# Patient Record
Sex: FEMALE | Race: OTHER | Employment: FULL TIME | URBAN - METROPOLITAN AREA
[De-identification: names, ages, dates, MRNs, and addresses within clinical notes are randomized per-mention and may not be internally consistent; named-entity substitution may affect disease eponyms.]

---

## 2017-01-04 ENCOUNTER — APPOINTMENT (OUTPATIENT)
Dept: OCCUPATIONAL MEDICINE | Age: 42
End: 2017-01-04
Attending: EMERGENCY MEDICINE

## 2017-01-13 ENCOUNTER — APPOINTMENT (OUTPATIENT)
Dept: OCCUPATIONAL MEDICINE | Age: 42
End: 2017-01-13
Attending: EMERGENCY MEDICINE

## 2017-04-08 ENCOUNTER — TELEPHONE (OUTPATIENT)
Dept: FAMILY MEDICINE CLINIC | Facility: CLINIC | Age: 42
End: 2017-04-08

## 2017-04-08 NOTE — TELEPHONE ENCOUNTER
On-call note that she is having a splitting headache that is going from her head down her shoulders. She states this started yesterday. It is on the left side of her face. Goes from her face down her left shoulder. She had migraines possibly years ago.

## 2017-05-30 ENCOUNTER — OFFICE VISIT (OUTPATIENT)
Dept: FAMILY MEDICINE CLINIC | Facility: CLINIC | Age: 42
End: 2017-05-30

## 2017-05-30 ENCOUNTER — LAB ENCOUNTER (OUTPATIENT)
Dept: LAB | Age: 42
End: 2017-05-30
Attending: FAMILY MEDICINE
Payer: COMMERCIAL

## 2017-05-30 VITALS
DIASTOLIC BLOOD PRESSURE: 70 MMHG | SYSTOLIC BLOOD PRESSURE: 106 MMHG | WEIGHT: 108 LBS | BODY MASS INDEX: 19.88 KG/M2 | TEMPERATURE: 98 F | HEIGHT: 62 IN | HEART RATE: 75 BPM

## 2017-05-30 DIAGNOSIS — R51.9 RIGHT-SIDED HEADACHE: ICD-10-CM

## 2017-05-30 DIAGNOSIS — R68.89 LIGHT SENSITIVITY: ICD-10-CM

## 2017-05-30 DIAGNOSIS — Z00.00 WELL ADULT EXAM: ICD-10-CM

## 2017-05-30 DIAGNOSIS — Z00.00 WELL ADULT EXAM: Primary | ICD-10-CM

## 2017-05-30 DIAGNOSIS — O09.A0 HISTORY OF MOLAR PREGNANCY, ANTEPARTUM: ICD-10-CM

## 2017-05-30 DIAGNOSIS — E28.39 PREMATURE OVARIAN FAILURE: ICD-10-CM

## 2017-05-30 PROCEDURE — 36415 COLL VENOUS BLD VENIPUNCTURE: CPT

## 2017-05-30 PROCEDURE — 99396 PREV VISIT EST AGE 40-64: CPT | Performed by: FAMILY MEDICINE

## 2017-05-30 PROCEDURE — 85025 COMPLETE CBC W/AUTO DIFF WBC: CPT

## 2017-05-30 PROCEDURE — 84450 TRANSFERASE (AST) (SGOT): CPT

## 2017-05-30 PROCEDURE — 80048 BASIC METABOLIC PNL TOTAL CA: CPT

## 2017-05-30 PROCEDURE — 80061 LIPID PANEL: CPT

## 2017-05-30 PROCEDURE — 84460 ALANINE AMINO (ALT) (SGPT): CPT

## 2017-05-30 PROCEDURE — 82306 VITAMIN D 25 HYDROXY: CPT

## 2017-05-30 PROCEDURE — 84443 ASSAY THYROID STIM HORMONE: CPT

## 2017-05-30 NOTE — PROGRESS NOTES
HPI:   Vanessa Tucker is a 35year old female who presents for a complete physical exam. Symptoms: no menses.     Hx of right sided headaches, usu 1 x month  Light sensitivity  Happens 1 x month  Sleeping helps    Wt Readings from Last 6 Encounters:  05/30 Disorder Father    • Hypertension Father    • Diabetes Neg       Social History:     Smoking Status: Never Smoker                      Alcohol Use: No              Occ: factory. : n. Children: n.       Immunization History   Administered Date(s) Admi check fasting Lipids, CMP, TSH and CBC. Pt info handouts given for: exercise, low fat diet and breast self-exam. Pt' s weight is Body mass index is 19.75 kg/(m^2). , recommended low fat diet and aerobic exercise 30 minutes three 5 weekly.  Immunizations and

## 2017-06-07 PROBLEM — E78.00 HYPERCHOLESTEREMIA: Status: ACTIVE | Noted: 2017-06-07

## 2017-06-08 ENCOUNTER — TELEPHONE (OUTPATIENT)
Dept: FAMILY MEDICINE CLINIC | Facility: CLINIC | Age: 42
End: 2017-06-08

## 2017-06-08 NOTE — TELEPHONE ENCOUNTER
----- Message from Darlene Noriega MD sent at 6/7/2017  7:23 PM CDT -----  Labs all good except Lipid profile shows slightly elevated cholesterol. Recommend  low fat/ low cholesterol diet and aerobic exercise 30 minutes at least 5 days a week.  Recommend repea

## 2017-06-22 NOTE — TELEPHONE ENCOUNTER
[facility] [phone]. No response letter sent with information below.

## 2018-04-03 ENCOUNTER — HOSPITAL ENCOUNTER (OUTPATIENT)
Age: 43
Discharge: HOME | End: 2018-04-03
Attending: EMERGENCY MEDICINE
Payer: COMMERCIAL

## 2018-04-03 VITALS
DIASTOLIC BLOOD PRESSURE: 67 MMHG | WEIGHT: 113 LBS | HEART RATE: 103 BPM | SYSTOLIC BLOOD PRESSURE: 106 MMHG | OXYGEN SATURATION: 99 % | BODY MASS INDEX: 20.8 KG/M2 | HEIGHT: 62 IN | RESPIRATION RATE: 20 BRPM | TEMPERATURE: 100 F

## 2018-04-03 DIAGNOSIS — J11.1 INFLUENZA-LIKE ILLNESS: Primary | ICD-10-CM

## 2018-04-03 DIAGNOSIS — H66.92 ACUTE LEFT OTITIS MEDIA: ICD-10-CM

## 2018-04-03 PROCEDURE — 99213 OFFICE O/P EST LOW 20 MIN: CPT

## 2018-04-03 PROCEDURE — 99204 OFFICE O/P NEW MOD 45 MIN: CPT

## 2018-04-03 PROCEDURE — 87631 RESP VIRUS 3-5 TARGETS: CPT | Performed by: EMERGENCY MEDICINE

## 2018-04-03 RX ORDER — AZITHROMYCIN 250 MG/1
TABLET, FILM COATED ORAL
Qty: 6 TABLET | Refills: 0 | Status: SHIPPED | OUTPATIENT
Start: 2018-04-03 | End: 2018-07-27

## 2018-04-03 NOTE — ED INITIAL ASSESSMENT (HPI)
Cough, body aches, fevers and chills x 3 days. No flu shot. Denies sore throat. Vomited once today and once yesterday. Has been drinking water, 7UP and has been urinating.

## 2018-04-03 NOTE — ED PROVIDER NOTES
Patient Seen in: [facility] In [address] [place] [address]    History   Patient presents with:  Cough/URI    Stated Complaint: body ache, cough    HPI    The patient is a 40-year-old female without contributory past medical history presents with compl None (Room air)    Current:/67   Pulse 103   Temp 100.1 °F (37.8 °C) (Oral)   Resp 20   Ht 157.5 cm (5' 2\")   Wt 51.3 kg   LMP  (LMP Unknown)   SpO2 99%   BMI 20.67 kg/m²         Physical Exam    Alert female who appears fatigued  HEENT: Normocephal

## 2018-07-03 ENCOUNTER — OFFICE VISIT (OUTPATIENT)
Dept: FAMILY MEDICINE CLINIC | Facility: CLINIC | Age: 43
End: 2018-07-03

## 2018-07-03 VITALS
DIASTOLIC BLOOD PRESSURE: 53 MMHG | HEART RATE: 52 BPM | SYSTOLIC BLOOD PRESSURE: 89 MMHG | HEIGHT: 62 IN | BODY MASS INDEX: 20.06 KG/M2 | TEMPERATURE: 98 F | WEIGHT: 109 LBS

## 2018-07-03 DIAGNOSIS — R53.83 FATIGUE, UNSPECIFIED TYPE: ICD-10-CM

## 2018-07-03 DIAGNOSIS — E28.39 PREMATURE OVARIAN FAILURE: ICD-10-CM

## 2018-07-03 DIAGNOSIS — Z00.00 WELL ADULT EXAM: Primary | ICD-10-CM

## 2018-07-03 DIAGNOSIS — E78.00 HYPERCHOLESTEREMIA: ICD-10-CM

## 2018-07-03 DIAGNOSIS — N91.2 AMENORRHEA: ICD-10-CM

## 2018-07-03 PROCEDURE — 99396 PREV VISIT EST AGE 40-64: CPT | Performed by: FAMILY MEDICINE

## 2018-07-03 NOTE — PROGRESS NOTES
HPI:   Garcia Alfaro is a 38year old female who presents for a complete physical exam. Symptoms: no menses since chemo.     Wt Readings from Last 6 Encounters:  07/03/18 : 109 lb (49.4 kg)  04/03/18 : 113 lb (51.3 kg)  05/30/17 : 108 lb (49 kg)  12/20/16 • High Blood Pressure Father    • Stroke Father 58   • Prostate Cancer Father    • Cancer Father      prostate cancer   • Lipids Father    • Heart Disorder Father    • Hypertension Father    • Diabetes Neg       Social History:   Smoking status: Never Sm Oriented times three,cranial nerves are intact,motor and sensory are grossly intact    ASSESSMENT AND PLAN:   Jb Bryant is a 38year old female who presents for a complete physical exam.Health maintenance, will check fasting Lipids, CMP, TSH and CBC.

## 2018-07-07 ENCOUNTER — LAB ENCOUNTER (OUTPATIENT)
Dept: LAB | Age: 43
End: 2018-07-07
Attending: FAMILY MEDICINE
Payer: COMMERCIAL

## 2018-07-07 DIAGNOSIS — Z00.00 WELL ADULT EXAM: ICD-10-CM

## 2018-07-07 LAB
ALT SERPL-CCNC: 17 U/L (ref 14–54)
ANION GAP SERPL CALC-SCNC: 7 MMOL/L (ref 0–18)
AST SERPL-CCNC: 24 U/L (ref 15–41)
BASOPHILS # BLD: 0 K/UL (ref 0–0.2)
BASOPHILS NFR BLD: 1 %
BUN SERPL-MCNC: 16 MG/DL (ref 8–20)
BUN/CREAT SERPL: 19 (ref 10–20)
CALCIUM SERPL-MCNC: 9.4 MG/DL (ref 8.5–10.5)
CHLORIDE SERPL-SCNC: 105 MMOL/L (ref 95–110)
CHOLEST SERPL-MCNC: 206 MG/DL (ref 110–200)
CO2 SERPL-SCNC: 27 MMOL/L (ref 22–32)
CREAT SERPL-MCNC: 0.84 MG/DL (ref 0.5–1.5)
EOSINOPHIL # BLD: 0.1 K/UL (ref 0–0.7)
EOSINOPHIL NFR BLD: 2 %
ERYTHROCYTE [DISTWIDTH] IN BLOOD BY AUTOMATED COUNT: 13.8 % (ref 11–15)
GLUCOSE SERPL-MCNC: 84 MG/DL (ref 70–99)
HCT VFR BLD AUTO: 42.7 % (ref 35–48)
HDLC SERPL-MCNC: 64 MG/DL
HGB BLD-MCNC: 14.4 G/DL (ref 12–16)
LDLC SERPL CALC-MCNC: 124 MG/DL (ref 0–99)
LYMPHOCYTES # BLD: 1.3 K/UL (ref 1–4)
LYMPHOCYTES NFR BLD: 19 %
MCH RBC QN AUTO: 29.7 PG (ref 27–32)
MCHC RBC AUTO-ENTMCNC: 33.7 G/DL (ref 32–37)
MCV RBC AUTO: 88.1 FL (ref 80–100)
MONOCYTES # BLD: 0.5 K/UL (ref 0–1)
MONOCYTES NFR BLD: 7 %
NEUTROPHILS # BLD AUTO: 4.7 K/UL (ref 1.8–7.7)
NEUTROPHILS NFR BLD: 72 %
NONHDLC SERPL-MCNC: 142 MG/DL
OSMOLALITY UR CALC.SUM OF ELEC: 288 MOSM/KG (ref 275–295)
PLATELET # BLD AUTO: 201 K/UL (ref 140–400)
PMV BLD AUTO: 9.6 FL (ref 7.4–10.3)
POTASSIUM SERPL-SCNC: 3.6 MMOL/L (ref 3.3–5.1)
RBC # BLD AUTO: 4.84 M/UL (ref 3.7–5.4)
SODIUM SERPL-SCNC: 139 MMOL/L (ref 136–144)
TRIGL SERPL-MCNC: 89 MG/DL (ref 1–149)
TSH SERPL-ACNC: 3.72 UIU/ML (ref 0.45–5.33)
WBC # BLD AUTO: 6.5 K/UL (ref 4–11)

## 2018-07-07 PROCEDURE — 80048 BASIC METABOLIC PNL TOTAL CA: CPT

## 2018-07-07 PROCEDURE — 85025 COMPLETE CBC W/AUTO DIFF WBC: CPT

## 2018-07-07 PROCEDURE — 84450 TRANSFERASE (AST) (SGOT): CPT

## 2018-07-07 PROCEDURE — 80061 LIPID PANEL: CPT

## 2018-07-07 PROCEDURE — 36415 COLL VENOUS BLD VENIPUNCTURE: CPT

## 2018-07-07 PROCEDURE — 84443 ASSAY THYROID STIM HORMONE: CPT

## 2018-07-07 PROCEDURE — 84460 ALANINE AMINO (ALT) (SGPT): CPT

## 2018-07-27 ENCOUNTER — OFFICE VISIT (OUTPATIENT)
Dept: OBGYN CLINIC | Facility: CLINIC | Age: 43
End: 2018-07-27

## 2018-07-27 VITALS
WEIGHT: 109 LBS | HEART RATE: 65 BPM | SYSTOLIC BLOOD PRESSURE: 92 MMHG | BODY MASS INDEX: 20.06 KG/M2 | DIASTOLIC BLOOD PRESSURE: 64 MMHG | HEIGHT: 61.7 IN

## 2018-07-27 DIAGNOSIS — N91.2 ABSENT PERIODS: ICD-10-CM

## 2018-07-27 DIAGNOSIS — Z01.411 ENCOUNTER FOR GYNECOLOGICAL EXAMINATION WITH ABNORMAL FINDING: Primary | ICD-10-CM

## 2018-07-27 DIAGNOSIS — Z12.31 VISIT FOR SCREENING MAMMOGRAM: ICD-10-CM

## 2018-07-27 PROCEDURE — 99396 PREV VISIT EST AGE 40-64: CPT | Performed by: OBSTETRICS & GYNECOLOGY

## 2018-07-27 PROCEDURE — 99213 OFFICE O/P EST LOW 20 MIN: CPT | Performed by: OBSTETRICS & GYNECOLOGY

## 2018-07-29 LAB
C TRACH DNA SPEC QL NAA+PROBE: NEGATIVE
HPV I/H RISK 1 DNA SPEC QL NAA+PROBE: NEGATIVE
N GONORRHOEA DNA SPEC QL NAA+PROBE: NEGATIVE

## 2018-07-29 NOTE — PROGRESS NOTES
Jonah Jacob is a 38year old female  No LMP recorded. Patient is not currently having periods (Reason: Other). Patient presents with:  Gyn Exam: ANNUAL EXAM  Menstrual Problem: no periods still. Stopped ocps due to ran out. Got periods on ocps. History Main Topics   Smoking status: Never Smoker    Smokeless tobacco: Never Used    Alcohol use No    Drug use: No    Sexual activity: Yes    Birth control/ protection: OCP     Other Topics Concern    Caffeine Concern No     Social History Narrative   N nipple retraction or skin changes  Abdomen:   soft, nontender, nondistended, no masses  Skin/Hair:  no unusual rashes or bruises  Extremities:  no edema, no cyanosis  Psychiatric:   oriented to time, place, person and situation.  Appropriate mood and affect abnormal vaginal bleeding. Mammogram order given. SBE encouraged. HIV, Hep B, Hep C, Trep, GC/Chl/Trich  screening ordered. Condoms encouraged. Recheck [address] [facility] [address] / [address] / E2 / hcg -- blood testing to be done day before test result visit.   Check u/s for painfu

## 2018-07-30 LAB — T VAGINALIS RRNA SPEC QL NAA+PROBE: NEGATIVE

## 2018-07-31 LAB — LAST PAP RESULT: NORMAL

## 2018-08-07 ENCOUNTER — HOSPITAL ENCOUNTER (OUTPATIENT)
Dept: ULTRASOUND IMAGING | Age: 43
Discharge: HOME | End: 2018-08-07
Attending: OBSTETRICS & GYNECOLOGY
Payer: COMMERCIAL

## 2018-08-07 ENCOUNTER — HOSPITAL ENCOUNTER (OUTPATIENT)
Dept: MAMMOGRAPHY | Age: 43
Discharge: HOME | End: 2018-08-07
Attending: OBSTETRICS & GYNECOLOGY
Payer: COMMERCIAL

## 2018-08-07 DIAGNOSIS — N91.2 ABSENT PERIODS: ICD-10-CM

## 2018-08-07 DIAGNOSIS — Z12.31 VISIT FOR SCREENING MAMMOGRAM: ICD-10-CM

## 2018-08-07 PROCEDURE — 76856 US EXAM PELVIC COMPLETE: CPT | Performed by: OBSTETRICS & GYNECOLOGY

## 2018-08-07 PROCEDURE — 77063 BREAST TOMOSYNTHESIS BI: CPT | Performed by: OBSTETRICS & GYNECOLOGY

## 2018-08-07 PROCEDURE — 76830 TRANSVAGINAL US NON-OB: CPT | Performed by: OBSTETRICS & GYNECOLOGY

## 2018-08-07 PROCEDURE — 77067 SCR MAMMO BI INCL CAD: CPT | Performed by: OBSTETRICS & GYNECOLOGY

## 2018-08-13 NOTE — PROGRESS NOTES
Send letter: Normal Pap and high risk HPV negative. Negative Gonorrhea / Chlamydia / Trichomonas screening. Continue with annual breast / pelvic exam (I will do pap if warrantied).

## 2018-08-14 ENCOUNTER — TELEPHONE (OUTPATIENT)
Dept: OBGYN CLINIC | Facility: CLINIC | Age: 43
End: 2018-08-14

## 2018-08-14 NOTE — TELEPHONE ENCOUNTER
----- Message from Rochelle Chaudhry MD sent at 8/13/2018 12:34 PM CDT -----  U/s with thin lining -- awaiting blood test results to determine if needs ocps again -- pt needs to call day after blood draw to get further instructions.

## 2018-08-15 NOTE — PROGRESS NOTES
Normal Pap and high risk HPV negative. Negative Gonorrhea / Chlamydia / Trichomonas screening. Continue with annual breast / pelvic exam (I will do pap if warrantiedLetter Sent via mail.

## 2018-08-15 NOTE — PROGRESS NOTES
Normal mammogram.  Next in one year. Encouraged to do monthly self breast exams. letter Sent via mail.

## 2018-08-19 ENCOUNTER — APPOINTMENT (OUTPATIENT)
Dept: LAB | Facility: HOSPITAL | Age: 43
End: 2018-08-19
Attending: OBSTETRICS & GYNECOLOGY
Payer: COMMERCIAL

## 2018-08-19 DIAGNOSIS — N91.2 ABSENT PERIODS: ICD-10-CM

## 2018-08-19 LAB
ESTRADIOL SERPL-MCNC: 29 PG/ML
FSH SERPL-ACNC: 281.6 MIU/ML
HCG SERPL QL: NEGATIVE
LH SERPL-ACNC: 81.8 MIU/ML

## 2018-08-19 PROCEDURE — 83002 ASSAY OF GONADOTROPIN (LH): CPT

## 2018-08-19 PROCEDURE — 84703 CHORIONIC GONADOTROPIN ASSAY: CPT

## 2018-08-19 PROCEDURE — 83001 ASSAY OF GONADOTROPIN (FSH): CPT

## 2018-08-19 PROCEDURE — 82670 ASSAY OF TOTAL ESTRADIOL: CPT

## 2018-08-19 PROCEDURE — 36415 COLL VENOUS BLD VENIPUNCTURE: CPT

## 2018-08-20 ENCOUNTER — OFFICE VISIT (OUTPATIENT)
Dept: OBGYN CLINIC | Facility: CLINIC | Age: 43
End: 2018-08-20

## 2018-08-20 VITALS — HEART RATE: 69 BPM | DIASTOLIC BLOOD PRESSURE: 60 MMHG | SYSTOLIC BLOOD PRESSURE: 96 MMHG

## 2018-08-20 DIAGNOSIS — Z78.0 MENOPAUSE: Primary | ICD-10-CM

## 2018-08-20 PROCEDURE — 99213 OFFICE O/P EST LOW 20 MIN: CPT | Performed by: OBSTETRICS & GYNECOLOGY

## 2018-08-20 NOTE — PROGRESS NOTES
Donnie Hernandez is a 37year old female  No LMP recorded. Patient is not currently having periods (Reason: Other). Patient presents with:  Gyn Problem: TEST REPORT -- tolerable hot flashes. 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     OB 0    ALLERGIES:  No Known Allergies      Review of Systems:  Constitutional:    denies fatigue, night sweats, hot flashes  Gastrointestinal:  denies abdominal pain, diarrhea or constipation  Genitourinary:    denies dysuria, abnormal vaginal discharge, vag

## 2019-08-02 ENCOUNTER — OFFICE VISIT (OUTPATIENT)
Dept: OBGYN CLINIC | Facility: CLINIC | Age: 44
End: 2019-08-02

## 2019-08-02 ENCOUNTER — TELEPHONE (OUTPATIENT)
Dept: OBGYN CLINIC | Facility: CLINIC | Age: 44
End: 2019-08-02

## 2019-08-02 VITALS
DIASTOLIC BLOOD PRESSURE: 60 MMHG | HEIGHT: 62.5 IN | SYSTOLIC BLOOD PRESSURE: 91 MMHG | BODY MASS INDEX: 19.67 KG/M2 | WEIGHT: 109.63 LBS | HEART RATE: 65 BPM

## 2019-08-02 DIAGNOSIS — Z12.31 VISIT FOR SCREENING MAMMOGRAM: ICD-10-CM

## 2019-08-02 DIAGNOSIS — Z78.0 MENOPAUSE: ICD-10-CM

## 2019-08-02 DIAGNOSIS — N95.2 ATROPHIC VAGINITIS: ICD-10-CM

## 2019-08-02 DIAGNOSIS — Z01.411 ENCOUNTER FOR GYNECOLOGICAL EXAMINATION WITH ABNORMAL FINDING: Primary | ICD-10-CM

## 2019-08-02 PROCEDURE — 99396 PREV VISIT EST AGE 40-64: CPT | Performed by: OBSTETRICS & GYNECOLOGY

## 2019-08-02 RX ORDER — ESTRADIOL 0.1 MG/G
0.5 CREAM VAGINAL DAILY
Qty: 42.5 G | Refills: 1 | Status: SHIPPED | OUTPATIENT
Start: 2019-08-02 | End: 2020-02-27

## 2019-08-02 NOTE — TELEPHONE ENCOUNTER
Pt states she is missing estrogen pills   Prescription was not sent to pharmacy. Pt only received vaginal cream       Current Outpatient Medications:  Estradiol 0.1 MG/GM Vaginal Cream Place 0.5 g vaginally daily.  Use nightly for 2 weeks then every Monday

## 2019-08-02 NOTE — TELEPHONE ENCOUNTER
Pt stated she met with [facility] today and stated that [facility] mentioned an estrogen pill to take. Pt informed that it appears [facility] sent an estrogen cream to pts pharmacy, but not a pill.  Pt informed that instructions state to use nightly for 2 weeks and then every M

## 2019-08-03 NOTE — PROGRESS NOTES
Luis Manuel Quinones is a 37year old female  No LMP recorded. (Menstrual status: Other). Patient presents with:  Gyn Exam: annual, mammo order, pt c/o extreme fatigue for 2 weeks.  Has not see PCP yet  Vaginal Problem: excess vaginal dryness -- uncomfort Social Needs      Financial resource strain: Not on file      Food insecurity:        Worry: Not on file        Inability: Not on file      Transportation needs:        Medical: Not on file        Non-medical: Not on file    Tobacco Use      Smoking status MEDICATIONS:    Current Outpatient Medications:   •  Estradiol 0.1 MG/GM Vaginal Cream, Place 0.5 g vaginally daily.  Use nightly for 2 weeks then every Monday / Thursday night, Disp: 42.5 g, Rfl: 1  •  ibuprofen 600 MG Oral Tab, Take 1 tablet (600 mg abnormal discharge  Cervix:    normal without tenderness on motion  Uterus:    normal in size, contour, position, mobility, without tenderness  Adnexa:   normal without masses or tenderness  Perineum:   normal  Anus: no hemorroids     Assessment & Plan:  M

## 2019-08-09 ENCOUNTER — HOSPITAL ENCOUNTER (OUTPATIENT)
Dept: MAMMOGRAPHY | Age: 44
Discharge: HOME | End: 2019-08-09
Attending: OBSTETRICS & GYNECOLOGY
Payer: COMMERCIAL

## 2019-08-09 DIAGNOSIS — Z12.31 VISIT FOR SCREENING MAMMOGRAM: ICD-10-CM

## 2019-08-09 PROCEDURE — 77067 SCR MAMMO BI INCL CAD: CPT | Performed by: OBSTETRICS & GYNECOLOGY

## 2019-08-09 PROCEDURE — 77063 BREAST TOMOSYNTHESIS BI: CPT | Performed by: OBSTETRICS & GYNECOLOGY

## 2019-08-14 ENCOUNTER — APPOINTMENT (OUTPATIENT)
Dept: LAB | Age: 44
End: 2019-08-14
Attending: FAMILY MEDICINE
Payer: COMMERCIAL

## 2019-08-14 ENCOUNTER — OFFICE VISIT (OUTPATIENT)
Dept: FAMILY MEDICINE CLINIC | Facility: CLINIC | Age: 44
End: 2019-08-14

## 2019-08-14 VITALS
HEART RATE: 60 BPM | HEIGHT: 62.5 IN | TEMPERATURE: 97 F | WEIGHT: 115 LBS | SYSTOLIC BLOOD PRESSURE: 100 MMHG | BODY MASS INDEX: 20.63 KG/M2 | DIASTOLIC BLOOD PRESSURE: 67 MMHG

## 2019-08-14 DIAGNOSIS — L81.9 HYPOPIGMENTATION: ICD-10-CM

## 2019-08-14 DIAGNOSIS — L81.9 HYPOPIGMENTATION: Primary | ICD-10-CM

## 2019-08-14 LAB — TSI SER-ACNC: 2.68 MIU/ML (ref 0.36–3.74)

## 2019-08-14 PROCEDURE — 86038 ANTINUCLEAR ANTIBODIES: CPT

## 2019-08-14 PROCEDURE — 84443 ASSAY THYROID STIM HORMONE: CPT

## 2019-08-14 PROCEDURE — 99213 OFFICE O/P EST LOW 20 MIN: CPT | Performed by: FAMILY MEDICINE

## 2019-08-14 PROCEDURE — 36415 COLL VENOUS BLD VENIPUNCTURE: CPT

## 2019-08-14 NOTE — PROGRESS NOTES
HPI:    Patient ID: Britany Weaver is a 53year old female. HPI  Patient presents with:  Referral: for dermatologst has white spots on her face for the last 2 weeks   Tired    Started 2 weeks ago with  Light patches on her face.   Spreading on face/ [employer]

## 2019-08-15 LAB — NUCLEAR IGG TITR SER IF: NEGATIVE {TITER}

## 2019-08-20 ENCOUNTER — TELEPHONE (OUTPATIENT)
Dept: OTHER | Age: 44
End: 2019-08-20

## 2019-08-20 NOTE — TELEPHONE ENCOUNTER
LOV 8/14/19 and was referred to dermatologist due to white spots face, states that she called and with initial OV on 10/14/19.     States that the white spots spreading to her face and to her neck, very itchy, no fever, no cp, no sob, advised to take [employer]

## 2019-08-21 ENCOUNTER — OFFICE VISIT (OUTPATIENT)
Dept: DERMATOLOGY CLINIC | Facility: CLINIC | Age: 44
End: 2019-08-21

## 2019-08-21 DIAGNOSIS — L30.9 DERMATITIS: Primary | ICD-10-CM

## 2019-08-21 DIAGNOSIS — L81.9 HYPOPIGMENTATION: ICD-10-CM

## 2019-08-21 PROCEDURE — 99202 OFFICE O/P NEW SF 15 MIN: CPT | Performed by: DERMATOLOGY

## 2019-08-21 RX ORDER — FLUCONAZOLE 100 MG/1
100 TABLET ORAL DAILY
Qty: 10 TABLET | Refills: 0 | Status: SHIPPED | OUTPATIENT
Start: 2019-08-21 | End: 2019-08-31

## 2019-08-21 RX ORDER — KETOCONAZOLE 20 MG/G
1 CREAM TOPICAL 2 TIMES DAILY
Qty: 30 G | Refills: 3 | Status: SHIPPED | OUTPATIENT
Start: 2019-08-21 | End: 2020-03-27

## 2019-08-21 RX ORDER — TRIAMCINOLONE ACETONIDE 5 MG/G
CREAM TOPICAL
Qty: 30 G | Refills: 1 | Status: SHIPPED | OUTPATIENT
Start: 2019-08-21

## 2019-09-02 NOTE — PROGRESS NOTES
Tad Srinivasan is a 53year old female. Patient presents with:  Derm Problem: LOV 3/14/2012. Pt presenting with hypopigmentation to face and neck. Pt states started 3 weeks prior. c/o spreading and itching - increased itching when mathew.  Pt states Vaginal Cream Place 0.5 g vaginally daily. Use nightly for 2 weeks then every Monday / Thursday night Disp: 42.5 g Rfl: 1   ibuprofen 600 MG Oral Tab Take 1 tablet (600 mg total) by mouth every 6 (six) hours as needed for Pain.  Disp: 30 tablet Rfl: 0     A file        Attends Christian service: Not on file        Active member of club or organization: Not on file        Attends meetings of clubs or organizations: Not on file        Relationship status: Not on file      Intimate partner violence:        Fear exposures. Has been hot. Does note some decrease in itching with the hydrocortisone no significant seasonal allergies. Patient presents with concerns above. Denies any other systemic complaints.   No fevers, chills, night sweats, photosensitivity, lym the next several weeks. Await clinical response to above therapy. RTC as noted to 3 weeks if not improving    The patient indicates understanding of these issues and agrees to the plan. The patient is asked to return as noted in follow-up/ above.

## 2019-09-04 ENCOUNTER — OFFICE VISIT (OUTPATIENT)
Dept: FAMILY MEDICINE CLINIC | Facility: CLINIC | Age: 44
End: 2019-09-04

## 2019-09-04 VITALS
HEART RATE: 75 BPM | WEIGHT: 116 LBS | SYSTOLIC BLOOD PRESSURE: 91 MMHG | HEIGHT: 62.5 IN | TEMPERATURE: 98 F | BODY MASS INDEX: 20.81 KG/M2 | DIASTOLIC BLOOD PRESSURE: 56 MMHG

## 2019-09-04 DIAGNOSIS — O09.A0 HISTORY OF MOLAR PREGNANCY, ANTEPARTUM: ICD-10-CM

## 2019-09-04 DIAGNOSIS — E78.00 HYPERCHOLESTEREMIA: ICD-10-CM

## 2019-09-04 DIAGNOSIS — E28.39 PREMATURE OVARIAN FAILURE: ICD-10-CM

## 2019-09-04 DIAGNOSIS — Z00.00 WELL ADULT EXAM: Primary | ICD-10-CM

## 2019-09-04 PROCEDURE — 99396 PREV VISIT EST AGE 40-64: CPT | Performed by: FAMILY MEDICINE

## 2019-09-04 NOTE — PROGRESS NOTES
HPI:    Patient ID: Jeffry Holguin is a 53year old female. HPI  No chief complaint on file. Last Tdap 3 yrs ago. Single. No kids  Works in [employer]  Non smoker.       Review of Systems   Constitutional: Negative for activity change, appetite rae operative 10/2010   • Depression     resolved   • Fibroid     hysteroscopy - operative 2004   • History of pregnancy     SAB x 5, D&C x 4;  molar pregn. ..chemoRx after   • Infertility     laparoscopy-diagnostic 2005   • Irregular menstrual cycle     neg en activity: Not on file    Other Topics      Concerns:         Service: Not Asked        Blood Transfusions: Not Asked        Caffeine Concern: No        Occupational Exposure: Not Asked        Hobby Hazards: Not Asked        Sleep Concern: Not Asked is oriented to person, place, and time. She appears well-developed and well-nourished. HENT:   Head: Normocephalic and atraumatic.    Right Ear: External ear normal.   Left Ear: External ear normal.   Nose: Nose normal.   Mouth/Throat: Oropharynx is clear ALT(SGPT) [E]      AST (SGOT) [E]      Basic Metabolic Panel (8) [E]      CBC W Differential W Platelet [E]      Lipid Panel [E]      Vitamin D, 25-Hydroxy [E]      Meds This Visit:  Requested Prescriptions      No prescriptions requested or ordered in thi

## 2019-09-07 ENCOUNTER — LAB ENCOUNTER (OUTPATIENT)
Dept: LAB | Facility: HOSPITAL | Age: 44
End: 2019-09-07
Attending: FAMILY MEDICINE
Payer: COMMERCIAL

## 2019-09-07 DIAGNOSIS — Z00.00 WELL ADULT EXAM: ICD-10-CM

## 2019-09-07 LAB
ALT SERPL-CCNC: 22 U/L (ref 13–56)
ANION GAP SERPL CALC-SCNC: 4 MMOL/L (ref 0–18)
AST SERPL-CCNC: 20 U/L (ref 15–37)
BASOPHILS # BLD AUTO: 0.04 X10(3) UL (ref 0–0.2)
BASOPHILS NFR BLD AUTO: 0.8 %
BUN BLD-MCNC: 19 MG/DL (ref 7–18)
BUN/CREAT SERPL: 19.6 (ref 10–20)
CALCIUM BLD-MCNC: 9.4 MG/DL (ref 8.5–10.1)
CHLORIDE SERPL-SCNC: 106 MMOL/L (ref 98–112)
CHOLEST SMN-MCNC: 226 MG/DL (ref ?–200)
CO2 SERPL-SCNC: 30 MMOL/L (ref 21–32)
CREAT BLD-MCNC: 0.97 MG/DL (ref 0.55–1.02)
DEPRECATED RDW RBC AUTO: 40.1 FL (ref 35.1–46.3)
EOSINOPHIL # BLD AUTO: 0.09 X10(3) UL (ref 0–0.7)
EOSINOPHIL NFR BLD AUTO: 1.9 %
ERYTHROCYTE [DISTWIDTH] IN BLOOD BY AUTOMATED COUNT: 12.2 % (ref 11–15)
GLUCOSE BLD-MCNC: 84 MG/DL (ref 70–99)
HCT VFR BLD AUTO: 43.3 % (ref 35–48)
HDLC SERPL-MCNC: 62 MG/DL (ref 40–59)
HGB BLD-MCNC: 14.5 G/DL (ref 12–16)
IMM GRANULOCYTES # BLD AUTO: 0.02 X10(3) UL (ref 0–1)
IMM GRANULOCYTES NFR BLD: 0.4 %
LDLC SERPL CALC-MCNC: 141 MG/DL (ref ?–100)
LYMPHOCYTES # BLD AUTO: 1.57 X10(3) UL (ref 1–4)
LYMPHOCYTES NFR BLD AUTO: 33 %
MCH RBC QN AUTO: 29.7 PG (ref 26–34)
MCHC RBC AUTO-ENTMCNC: 33.5 G/DL (ref 31–37)
MCV RBC AUTO: 88.7 FL (ref 80–100)
MONOCYTES # BLD AUTO: 0.32 X10(3) UL (ref 0.1–1)
MONOCYTES NFR BLD AUTO: 6.7 %
NEUTROPHILS # BLD AUTO: 2.72 X10 (3) UL (ref 1.5–7.7)
NEUTROPHILS # BLD AUTO: 2.72 X10(3) UL (ref 1.5–7.7)
NEUTROPHILS NFR BLD AUTO: 57.2 %
NONHDLC SERPL-MCNC: 164 MG/DL (ref ?–130)
OSMOLALITY SERPL CALC.SUM OF ELEC: 291 MOSM/KG (ref 275–295)
PATIENT FASTING: YES
PATIENT FASTING: YES
PLATELET # BLD AUTO: 243 10(3)UL (ref 150–450)
POTASSIUM SERPL-SCNC: 3.8 MMOL/L (ref 3.5–5.1)
RBC # BLD AUTO: 4.88 X10(6)UL (ref 3.8–5.3)
SODIUM SERPL-SCNC: 140 MMOL/L (ref 136–145)
TRIGL SERPL-MCNC: 116 MG/DL (ref 30–149)
VLDLC SERPL CALC-MCNC: 23 MG/DL (ref 0–30)
WBC # BLD AUTO: 4.8 X10(3) UL (ref 4–11)

## 2019-09-07 PROCEDURE — 36415 COLL VENOUS BLD VENIPUNCTURE: CPT

## 2019-09-07 PROCEDURE — 85025 COMPLETE CBC W/AUTO DIFF WBC: CPT

## 2019-09-07 PROCEDURE — 80061 LIPID PANEL: CPT

## 2019-09-07 PROCEDURE — 80048 BASIC METABOLIC PNL TOTAL CA: CPT

## 2019-09-07 PROCEDURE — 82306 VITAMIN D 25 HYDROXY: CPT

## 2019-09-07 PROCEDURE — 84460 ALANINE AMINO (ALT) (SGPT): CPT

## 2019-09-07 PROCEDURE — 84450 TRANSFERASE (AST) (SGOT): CPT

## 2019-09-09 LAB — 25(OH)D3 SERPL-MCNC: 27.6 NG/ML (ref 30–100)

## 2020-02-27 ENCOUNTER — OFFICE VISIT (OUTPATIENT)
Dept: FAMILY MEDICINE CLINIC | Facility: CLINIC | Age: 45
End: 2020-02-27

## 2020-02-27 VITALS
DIASTOLIC BLOOD PRESSURE: 66 MMHG | TEMPERATURE: 98 F | HEART RATE: 69 BPM | WEIGHT: 116 LBS | HEIGHT: 62.5 IN | SYSTOLIC BLOOD PRESSURE: 104 MMHG | BODY MASS INDEX: 20.81 KG/M2

## 2020-02-27 DIAGNOSIS — Z56.6 STRESS AT WORK: ICD-10-CM

## 2020-02-27 DIAGNOSIS — E78.00 HYPERCHOLESTEREMIA: ICD-10-CM

## 2020-02-27 DIAGNOSIS — L81.9 HYPOPIGMENTATION: Primary | ICD-10-CM

## 2020-02-27 DIAGNOSIS — Z87.81 HISTORY OF CLOSED FRACTURE OF NASAL BONES: ICD-10-CM

## 2020-02-27 PROCEDURE — 99214 OFFICE O/P EST MOD 30 MIN: CPT | Performed by: FAMILY MEDICINE

## 2020-02-27 SDOH — HEALTH STABILITY - MENTAL HEALTH: OTHER PHYSICAL AND MENTAL STRAIN RELATED TO WORK: Z56.6

## 2020-02-27 NOTE — PROGRESS NOTES
HPI:    Patient ID: Joycelyn Hackett is a 53year old female.     HPI  Patient presents with:  Skin: white spots on face has had them in the summer before would like lab orders to make  sure everything is well   Stress: and panic attacks pt states she works regular rhythm and normal heart sounds. Pulmonary/Chest: Effort normal and breath sounds normal.   Skin:   Patient has areas of slight hyperpigmentation as well as hypopigmentation on her face.   No scaling    Psychiatric: Her speech is normal and behavio

## 2020-02-27 NOTE — PATIENT INSTRUCTIONS
Manage Stress with a Healthy Lifestyle    Managing stress is easier if you take good care of yourself. Make time for rest and recreation. Eat healthier meals. Take a walk now and then. And don’t forget to treat yourself.  A little down time can go a long Exercise helps burn off the negative energy of stress. Doing something active that you enjoy also helps you get away from stressful situations. Try to walk, jog, skate, swim, dance, take a fitness class, or play a team sport on most days.  Or practice yoga

## 2020-02-29 ENCOUNTER — LAB ENCOUNTER (OUTPATIENT)
Dept: LAB | Age: 45
End: 2020-02-29
Attending: FAMILY MEDICINE
Payer: COMMERCIAL

## 2020-02-29 DIAGNOSIS — E78.00 HYPERCHOLESTEREMIA: ICD-10-CM

## 2020-02-29 LAB
CHOLEST SMN-MCNC: 231 MG/DL (ref ?–200)
HDLC SERPL-MCNC: 69 MG/DL (ref 40–59)
LDLC SERPL CALC-MCNC: 153 MG/DL (ref ?–100)
NONHDLC SERPL-MCNC: 162 MG/DL (ref ?–130)
PATIENT FASTING Y/N/NP: YES
TRIGL SERPL-MCNC: 43 MG/DL (ref 30–149)
VLDLC SERPL CALC-MCNC: 9 MG/DL (ref 0–30)

## 2020-02-29 PROCEDURE — 36415 COLL VENOUS BLD VENIPUNCTURE: CPT

## 2020-02-29 PROCEDURE — 80061 LIPID PANEL: CPT

## 2020-03-07 ENCOUNTER — TELEPHONE (OUTPATIENT)
Dept: SURGERY | Facility: CLINIC | Age: 45
End: 2020-03-07

## 2020-03-07 NOTE — TELEPHONE ENCOUNTER
----- Message from Andre Dominguez MD sent at 3/4/2020  7:51 PM CST -----  Lipids worse   Would recommend cholesterol lowering medication  - low dose  Please inform patient  Would recommend low fat low cholesterol diet   Let me know if she agrees and if so sh

## 2020-03-09 NOTE — TELEPHONE ENCOUNTER
See LAB 2/29/20. Left message to call back-3rd attempt. NO PHONE RESPONSE LETTER mail today. No MyChart. Dr Pabon=GLORIA.     Please reply to pool: MAGO Islas

## 2020-03-13 ENCOUNTER — OFFICE VISIT (OUTPATIENT)
Dept: DERMATOLOGY CLINIC | Facility: CLINIC | Age: 45
End: 2020-03-13

## 2020-03-13 DIAGNOSIS — L81.9 HYPOPIGMENTATION: ICD-10-CM

## 2020-03-13 DIAGNOSIS — L30.9 DERMATITIS: Primary | ICD-10-CM

## 2020-03-13 PROCEDURE — 99213 OFFICE O/P EST LOW 20 MIN: CPT | Performed by: DERMATOLOGY

## 2020-03-13 RX ORDER — ERGOCALCIFEROL 1.25 MG/1
50000 CAPSULE ORAL WEEKLY
Qty: 4 CAPSULE | Refills: 2 | Status: SHIPPED | OUTPATIENT
Start: 2020-03-13

## 2020-03-13 RX ORDER — FLUCONAZOLE 100 MG/1
100 TABLET ORAL DAILY
Qty: 10 TABLET | Refills: 0 | Status: SHIPPED | OUTPATIENT
Start: 2020-03-13

## 2020-03-23 NOTE — PROGRESS NOTES
Sorin Desai is a 53year old female. Patient presents with:  Derm Problem: LOV 8/21/2019. Pt presenting with concerns of returning tinea versicolor-like hypopigmentation to face. Pt using triamcinolone. Patient has no known allergies. bid as directed 30 g 1   • ibuprofen 600 MG Oral Tab Take 1 tablet (600 mg total) by mouth every 6 (six) hours as needed for Pain. 30 tablet 0   • ketoconazole 2 % External Cream Apply 1 Application topically 2 (two) times daily.  Apply to affected area 2 t connections:        Talks on phone: Not on file        Gets together: Not on file        Attends Cheondo[employer] service: Not on file        Active member of club or organization: Not on file        Attends meetings of clubs or organizations: Not on file brows lateral cheeks. Patient under great deal of stress. Longstanding history of premature ovarian failure. Feels hormonal changes have aggravated her skin is more sensitive easily irritated.   Complains of fatigue, dry skin worsening     patient presen reviewed vitamin D low. Will begin vitamin D. Follow-up with PCP regarding other nonspecific complaints. Need to follow-up with GYN regarding hormone management. No other suspicious lesions skin care discussed.   RTC as noted    The patient indicates

## 2020-03-27 ENCOUNTER — OFFICE VISIT (OUTPATIENT)
Dept: OBGYN CLINIC | Facility: CLINIC | Age: 45
End: 2020-03-27

## 2020-03-27 VITALS
BODY MASS INDEX: 21 KG/M2 | SYSTOLIC BLOOD PRESSURE: 88 MMHG | WEIGHT: 116 LBS | HEART RATE: 67 BPM | DIASTOLIC BLOOD PRESSURE: 57 MMHG

## 2020-03-27 DIAGNOSIS — N89.8 VAGINAL DISCHARGE: Primary | ICD-10-CM

## 2020-03-27 PROCEDURE — 99213 OFFICE O/P EST LOW 20 MIN: CPT | Performed by: OBSTETRICS & GYNECOLOGY

## 2020-03-27 NOTE — PROGRESS NOTES
Noa Montemayor is a 53year old female  No LMP recorded. Patient is premenopausal. Patient presents with:  Gyn Problem:  discharge with itchiness. Leanora Jamin ... wants a hormone test    NJG pt. Having white vaginal discharge for several days. No odor.   Slig 10 tablet 0   • triamcinolone acetonide 0.5 % External Cream Use bid as directed 30 g 1   • ibuprofen 600 MG Oral Tab Take 1 tablet (600 mg total) by mouth every 6 (six) hours as needed for Pain.  30 tablet 0       ALLERGIES:  No Known Allergies      PHYSIC

## 2020-03-29 LAB — TRICHOMONAS SCREEN: NEGATIVE

## 2020-04-07 ENCOUNTER — NURSE TRIAGE (OUTPATIENT)
Dept: FAMILY MEDICINE CLINIC | Facility: CLINIC | Age: 45
End: 2020-04-07

## 2020-04-07 ENCOUNTER — HOSPITAL ENCOUNTER (EMERGENCY)
Facility: HOSPITAL | Age: 45
Discharge: HOME | End: 2020-04-07
Attending: EMERGENCY MEDICINE
Payer: COMMERCIAL

## 2020-04-07 VITALS
WEIGHT: 115.94 LBS | RESPIRATION RATE: 18 BRPM | TEMPERATURE: 98 F | BODY MASS INDEX: 21 KG/M2 | SYSTOLIC BLOOD PRESSURE: 115 MMHG | DIASTOLIC BLOOD PRESSURE: 68 MMHG | OXYGEN SATURATION: 98 % | HEART RATE: 78 BPM

## 2020-04-07 DIAGNOSIS — G43.809 OTHER MIGRAINE WITHOUT STATUS MIGRAINOSUS, NOT INTRACTABLE: Primary | ICD-10-CM

## 2020-04-07 PROCEDURE — 99284 EMERGENCY DEPT VISIT MOD MDM: CPT

## 2020-04-07 PROCEDURE — 96374 THER/PROPH/DIAG INJ IV PUSH: CPT

## 2020-04-07 PROCEDURE — 85025 COMPLETE CBC W/AUTO DIFF WBC: CPT | Performed by: EMERGENCY MEDICINE

## 2020-04-07 PROCEDURE — 96361 HYDRATE IV INFUSION ADD-ON: CPT

## 2020-04-07 PROCEDURE — 81025 URINE PREGNANCY TEST: CPT

## 2020-04-07 PROCEDURE — 96375 TX/PRO/DX INJ NEW DRUG ADDON: CPT

## 2020-04-07 PROCEDURE — 80048 BASIC METABOLIC PNL TOTAL CA: CPT | Performed by: EMERGENCY MEDICINE

## 2020-04-07 RX ORDER — METOCLOPRAMIDE HYDROCHLORIDE 5 MG/ML
10 INJECTION INTRAMUSCULAR; INTRAVENOUS ONCE
Status: COMPLETED | OUTPATIENT
Start: 2020-04-07 | End: 2020-04-07

## 2020-04-07 RX ORDER — KETOROLAC TROMETHAMINE 30 MG/ML
30 INJECTION, SOLUTION INTRAMUSCULAR; INTRAVENOUS ONCE
Status: COMPLETED | OUTPATIENT
Start: 2020-04-07 | End: 2020-04-07

## 2020-04-07 RX ORDER — FLUTICASONE PROPIONATE 50 MCG
2 SPRAY, SUSPENSION (ML) NASAL DAILY
Qty: 16 G | Refills: 0 | Status: SHIPPED | OUTPATIENT
Start: 2020-04-07 | End: 2020-05-07

## 2020-04-07 RX ORDER — DIPHENHYDRAMINE HYDROCHLORIDE 50 MG/ML
25 INJECTION INTRAMUSCULAR; INTRAVENOUS ONCE
Status: COMPLETED | OUTPATIENT
Start: 2020-04-07 | End: 2020-04-07

## 2020-04-07 RX ORDER — PSEUDOEPHEDRINE HCL 120 MG/1
120 TABLET, FILM COATED, EXTENDED RELEASE ORAL EVERY 12 HOURS PRN
Qty: 10 TABLET | Refills: 0 | Status: SHIPPED | OUTPATIENT
Start: 2020-04-07 | End: 2020-05-07

## 2020-04-07 RX ORDER — BUTALBITAL, ACETAMINOPHEN AND CAFFEINE 50; 325; 40 MG/1; MG/1; MG/1
1 TABLET ORAL EVERY 6 HOURS PRN
Qty: 10 TABLET | Refills: 0 | Status: SHIPPED | OUTPATIENT
Start: 2020-04-07 | End: 2020-04-14

## 2020-04-07 NOTE — TELEPHONE ENCOUNTER
Action Requested: Summary for Provider     []  Critical Lab, Recommendations Needed  [] Need Additional Advice  []   FYI    []   Need Orders  [] Need Medications Sent to Pharmacy  []  Other     SUMMARY: sent to ER. Will wear mask.  Informed patient of hos

## 2020-04-07 NOTE — ED PROVIDER NOTES
Patient Seen in: [facility] Emergency Department      History   Patient presents with:  Migraine    Stated Complaint: headache    HPI    53-year-old female with history of headaches, here with complaints of headache for the past 2 days.   Symptoms Negative for abdominal pain, diarrhea, nausea and vomiting. Genitourinary: Negative for dysuria, flank pain and frequency. Musculoskeletal: Positive for neck pain. Negative for back pain. Skin: Negative for rash.    Neurological: Positive for headache place, and time.       Comments: 5/5 strength in b/l UEs and LEs, normal sensation in all extremities, normal finger to nose b/l, normal gait, no facial asymmetry, normal speech             ED Course     Pulse Oximeter:  Pulse oximetry on room air is 98%, i 0.20 x10(3) uL    Immature Granulocyte Absolute 0.01 0.00 - 1.00 x10(3) uL    Neutrophil % 62.0 %    Lymphocyte % 30.7 %    Monocyte % 5.6 %    Eosinophil % 1.0 %    Basophil % 0.5 %    Immature Granulocyte % 0.2 %       Imaging Results Available and Revie appointment as soon as possible for a visit in 2 days  As needed        Medications Prescribed:  Current Discharge Medication List    START taking these medications    PSEUDOEPHEDRINE HCL  MG Oral Tablet 12 Hr  Take 1 tablet (120 mg total) by mouth e

## 2020-04-07 NOTE — ED NOTES
Patient with normal neuro exam.   IV established, medicated as ordered. Lights dimmed for comfort. UCG neg.

## 2020-04-07 NOTE — ED INITIAL ASSESSMENT (HPI)
Patient here with c/o right sided migraine headache since yesterday radiating down neck. Hx of migraines.

## 2020-04-08 NOTE — TELEPHONE ENCOUNTER
Per ED notes  Follow-up:  Weston Sanz, [facility]  [address] [facility] [address]  [phone]     Schedule an appointment as soon as possible for a visit in 2 days  As needed    Do  You want a video/telephone visit?

## 2020-04-09 ENCOUNTER — VIRTUAL PHONE E/M (OUTPATIENT)
Dept: FAMILY MEDICINE CLINIC | Facility: CLINIC | Age: 45
End: 2020-04-09

## 2020-04-09 DIAGNOSIS — J30.9 ALLERGIC RHINITIS, UNSPECIFIED SEASONALITY, UNSPECIFIED TRIGGER: ICD-10-CM

## 2020-04-09 DIAGNOSIS — G43.109 MIGRAINE WITH AURA AND WITHOUT STATUS MIGRAINOSUS, NOT INTRACTABLE: Primary | ICD-10-CM

## 2020-04-09 PROCEDURE — 99213 OFFICE O/P EST LOW 20 MIN: CPT | Performed by: FAMILY MEDICINE

## 2020-04-09 NOTE — PROGRESS NOTES
TELEPHONE VISIT PROGRESS NOTE  Todays date: 4/9/2020 3:22 PM      Due to the COVID-19 emergency implementation plan, this patient's incoming call was converted to a telephone visit as agreed upon with the patient.     Patient was told to follow up pcp after endometrial biopsy   • D & C         x 4   • HYSTEROSCOPY   2004     operative   • LAPAROSCOPY,DIAGNOSTIC   2005, 10/2010                            Social History    Tobacco Use      Smoking status: Never Smoker      Smokeless tobacco: Never Used    [employer] Musculoskeletal: Normal range of motion. Cardiovascular:      Rate and Rhythm: Normal rate and regular rhythm. Heart sounds: No murmur.       Comments: 2+ radial and DP pulses b/l, no leg swelling  Pulmonary:      Effort: Pulmonary effort is normal. Osmolality 285 275 - 295 mOsm/kg     GFR, Non- 77 >=60     GFR, -American 89 >=60   CBC W/ DIFFERENTIAL     Collection Time: 04/07/20  9:55 AM   Result Value Ref Range     WBC 6.3 4.0 - 11.0 x10(3) uL     RBC 5.07 3.80 - 5.30 x10(6)u but has not had them for some time. 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 She is just returning from her work today. She states there has been no fever. Denies any problems with balance numbness or tingling.     PATIENTS DENIES nose.  Recommend to continue with Flonase and to continue Sudafed for next few days and then switch over to an antihistamine.   Encourage drinking plenty of fluids and to continue wearing a mask specially at work as she works in [employer] and is rafael and

## 2020-07-01 ENCOUNTER — OFFICE VISIT (OUTPATIENT)
Dept: FAMILY MEDICINE CLINIC | Facility: CLINIC | Age: 45
End: 2020-07-01

## 2020-07-01 VITALS
SYSTOLIC BLOOD PRESSURE: 104 MMHG | DIASTOLIC BLOOD PRESSURE: 63 MMHG | TEMPERATURE: 98 F | HEIGHT: 62.5 IN | RESPIRATION RATE: 14 BRPM | BODY MASS INDEX: 20.81 KG/M2 | HEART RATE: 71 BPM | WEIGHT: 116 LBS

## 2020-07-01 DIAGNOSIS — K59.00 CONSTIPATION, UNSPECIFIED CONSTIPATION TYPE: ICD-10-CM

## 2020-07-01 DIAGNOSIS — K12.0 APHTHOUS ULCER OF MOUTH: Primary | ICD-10-CM

## 2020-07-01 PROCEDURE — 99213 OFFICE O/P EST LOW 20 MIN: CPT | Performed by: STUDENT IN AN ORGANIZED HEALTH CARE EDUCATION/TRAINING PROGRAM

## 2020-07-02 NOTE — PROGRESS NOTES
HPI:    Patient ID: Matthew Boxer is a 53year old female. HPI  Pt presenting with painful oral blisters. Pt developed small blisters on inner lower lip that have become painful over the last day.  No improvement with salt water gargle or application o membranes are moist. Oral lesions present. Tongue: No lesions. Palate: No lesions. Tonsils: No tonsillar exudate.       Comments: Small blisters containing clear fluid visualized on lower gums and inside of lower lip  Eyes:      Conjunctiva/s hydration, especially due to warm weather    RTC in fall for annual physical or earlier as needed. Pt verbalized understanding and agrees with plan. No orders of the defined types were placed in this encounter.       Meds This Visit:  Requested Prescript

## 2020-09-17 ENCOUNTER — TELEPHONE (OUTPATIENT)
Dept: CASE MANAGEMENT | Age: 45
End: 2020-09-17

## 2020-09-17 ENCOUNTER — OFFICE VISIT (OUTPATIENT)
Dept: OTOLARYNGOLOGY | Facility: CLINIC | Age: 45
End: 2020-09-17

## 2020-09-17 ENCOUNTER — TELEPHONE (OUTPATIENT)
Dept: FAMILY MEDICINE CLINIC | Facility: CLINIC | Age: 45
End: 2020-09-17

## 2020-09-17 VITALS
TEMPERATURE: 98 F | SYSTOLIC BLOOD PRESSURE: 102 MMHG | RESPIRATION RATE: 18 BRPM | HEART RATE: 74 BPM | DIASTOLIC BLOOD PRESSURE: 62 MMHG | BODY MASS INDEX: 20.81 KG/M2 | WEIGHT: 116 LBS | HEIGHT: 62.5 IN

## 2020-09-17 DIAGNOSIS — H53.8 BLURRY VISION, BILATERAL: ICD-10-CM

## 2020-09-17 DIAGNOSIS — H53.8 BLURRY VISION, LEFT EYE: Primary | ICD-10-CM

## 2020-09-17 DIAGNOSIS — H53.8 BLURRY VISION, BILATERAL: Primary | ICD-10-CM

## 2020-09-17 DIAGNOSIS — J34.2 DEVIATED NASAL SEPTUM: Primary | ICD-10-CM

## 2020-09-17 PROCEDURE — 99243 OFF/OP CNSLTJ NEW/EST LOW 30: CPT | Performed by: OTOLARYNGOLOGY

## 2020-09-17 PROCEDURE — 3074F SYST BP LT 130 MM HG: CPT | Performed by: OTOLARYNGOLOGY

## 2020-09-17 PROCEDURE — 3078F DIAST BP <80 MM HG: CPT | Performed by: OTOLARYNGOLOGY

## 2020-09-17 PROCEDURE — 3008F BODY MASS INDEX DOCD: CPT | Performed by: OTOLARYNGOLOGY

## 2020-09-17 RX ORDER — AZELASTINE 1 MG/ML
2 SPRAY, METERED NASAL 2 TIMES DAILY
Qty: 1 BOTTLE | Refills: 0 | Status: SHIPPED | OUTPATIENT
Start: 2020-09-17

## 2020-09-17 RX ORDER — LORATADINE 10 MG/1
10 TABLET ORAL DAILY
Qty: 30 TABLET | Refills: 3 | Status: SHIPPED | OUTPATIENT
Start: 2020-09-17

## 2020-09-17 RX ORDER — MONTELUKAST SODIUM 10 MG/1
10 TABLET ORAL NIGHTLY
Qty: 30 TABLET | Refills: 3 | Status: SHIPPED | OUTPATIENT
Start: 2020-09-17

## 2020-09-17 NOTE — TELEPHONE ENCOUNTER
Patient called requesting new referral to dr tavarez for follow up for her eyes, blurry    Please sign off referral    Thanks    [address] [facility]
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
## 2020-09-17 NOTE — TELEPHONE ENCOUNTER
Dr. Modesta Turner,    I am working on the referral you submitted for [facility] to see Dr. Lawyer Villa. He is not in network with her insurance. I changed the doctor to doctor Silvia Ladd. Please advise patient of the change.     Thank you  Belem Warner

## 2020-09-22 ENCOUNTER — NURSE TRIAGE (OUTPATIENT)
Dept: FAMILY MEDICINE CLINIC | Facility: CLINIC | Age: 45
End: 2020-09-22

## 2020-09-22 ENCOUNTER — TELEPHONE (OUTPATIENT)
Dept: FAMILY MEDICINE CLINIC | Facility: CLINIC | Age: 45
End: 2020-09-22

## 2020-09-22 ENCOUNTER — VIRTUAL PHONE E/M (OUTPATIENT)
Dept: FAMILY MEDICINE CLINIC | Facility: CLINIC | Age: 45
End: 2020-09-22

## 2020-09-22 DIAGNOSIS — R51.9 CHRONIC NONINTRACTABLE HEADACHE, UNSPECIFIED HEADACHE TYPE: Primary | ICD-10-CM

## 2020-09-22 DIAGNOSIS — G89.29 CHRONIC NONINTRACTABLE HEADACHE, UNSPECIFIED HEADACHE TYPE: Primary | ICD-10-CM

## 2020-09-22 DIAGNOSIS — R51.9 WORSENING HEADACHES: Primary | ICD-10-CM

## 2020-09-22 DIAGNOSIS — R51.9 HEADACHE DISORDER: Primary | ICD-10-CM

## 2020-09-22 PROCEDURE — 99213 OFFICE O/P EST LOW 20 MIN: CPT | Performed by: FAMILY MEDICINE

## 2020-09-22 NOTE — PROGRESS NOTES
HPI:    Patient ID: Dariana Franklin is a 50year old female. Virtual Telephone Check-In    Dariana Franklin verbally consents to a Virtual/Telephone Check-In visit on 09/22/20.   Patient has been referred to the [facility] website at [email or web address] to times daily before meals. Swish and Smallow or Swish and Spit (Patient not taking: Reported on 9/17/2020 ) 90 mL 0   • ergocalciferol 1.25 MG (38721 UT) Oral Cap Take 1 capsule (50,000 Units total) by mouth once a week.  4 capsule 2   • fluconazole 100 MG O

## 2020-09-22 NOTE — TELEPHONE ENCOUNTER
Action Requested: Summary for Provider     []  Critical Lab, Recommendations Needed  [] Need Additional Advice  []   FYI    []   Need Orders  [] Need Medications Sent to Pharmacy  []  Other     SUMMARY: Patient requesting order for MRI or CT, reports since

## 2020-09-23 ENCOUNTER — TELEPHONE (OUTPATIENT)
Dept: FAMILY MEDICINE CLINIC | Facility: CLINIC | Age: 45
End: 2020-09-23

## 2020-09-23 RX ORDER — TRAMADOL HYDROCHLORIDE 50 MG/1
50 TABLET ORAL EVERY 6 HOURS PRN
Qty: 30 TABLET | Refills: 0 | Status: SHIPPED | OUTPATIENT
Start: 2020-09-23

## 2020-09-23 NOTE — TELEPHONE ENCOUNTER
Will defer imaging to neurology or Dr Sid Couch. Can call in tramadol for headaches. Erx sent to pharmacy for this. Can notify pt.

## 2020-09-23 NOTE — TELEPHONE ENCOUNTER
Patient had a televisit with Dr Washington Aguilar yesterday. Patient still having headaches. Has missed 3 days of work. Patient has an appointment with the neurologist on 11/6/2020. Wanted to get an order for a CT scan of her head. States can not take this pain anymore.

## 2020-09-23 NOTE — TELEPHONE ENCOUNTER
I will order a CT for worsening headaches.   Please have patient confirm with insurance if its covered first before she does test  Please have her follow up neurology and if worsening/ visual changes/ vomiting/ weakness/ tingling to go to ER

## 2020-09-23 NOTE — TELEPHONE ENCOUNTER
Pt states that she still has a migraine. She has had this HA since Monday. She has been unable to work due to HA which includes: nausea, vomiting, blurred vision and photo sensitivity. Pt is again asking for an order for an MRI.   She states that she has

## 2020-10-01 ENCOUNTER — HOSPITAL ENCOUNTER (OUTPATIENT)
Dept: CT IMAGING | Facility: HOSPITAL | Age: 45
Discharge: HOME | End: 2020-10-01
Attending: FAMILY MEDICINE
Payer: COMMERCIAL

## 2020-10-01 DIAGNOSIS — R51.9 WORSENING HEADACHES: ICD-10-CM

## 2020-10-01 PROCEDURE — 70450 CT HEAD/BRAIN W/O DYE: CPT | Performed by: FAMILY MEDICINE

## 2020-10-03 ENCOUNTER — TELEPHONE (OUTPATIENT)
Dept: FAMILY MEDICINE CLINIC | Facility: CLINIC | Age: 45
End: 2020-10-03

## 2020-10-03 NOTE — TELEPHONE ENCOUNTER
----- Message from Librado Turcios MD sent at 10/1/2020  5:00 PM CDT -----  Results within normal limits, please call and notify patient.

## 2020-10-08 ENCOUNTER — OFFICE VISIT (OUTPATIENT)
Dept: OTOLARYNGOLOGY | Facility: CLINIC | Age: 45
End: 2020-10-08

## 2020-10-08 VITALS
BODY MASS INDEX: 20.81 KG/M2 | HEIGHT: 62.5 IN | WEIGHT: 116 LBS | TEMPERATURE: 98 F | SYSTOLIC BLOOD PRESSURE: 84 MMHG | DIASTOLIC BLOOD PRESSURE: 50 MMHG

## 2020-10-08 DIAGNOSIS — J34.2 DEVIATED NASAL SEPTUM: Primary | ICD-10-CM

## 2020-10-08 PROCEDURE — 99214 OFFICE O/P EST MOD 30 MIN: CPT | Performed by: OTOLARYNGOLOGY

## 2020-10-08 PROCEDURE — 3008F BODY MASS INDEX DOCD: CPT | Performed by: OTOLARYNGOLOGY

## 2020-10-08 PROCEDURE — 3078F DIAST BP <80 MM HG: CPT | Performed by: OTOLARYNGOLOGY

## 2020-10-08 PROCEDURE — 3074F SYST BP LT 130 MM HG: CPT | Performed by: OTOLARYNGOLOGY
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
## 2020-10-22 RX ORDER — ERGOCALCIFEROL 1.25 MG/1
CAPSULE ORAL
Qty: 4 CAPSULE | Refills: 2 | OUTPATIENT
Start: 2020-10-22

## 2020-10-22 NOTE — TELEPHONE ENCOUNTER
Patient should follow-up with PCP for repeat vitamin D level.   Based on patient's vitamin D level would not recommend refills at this time

## 2020-11-03 ENCOUNTER — TELEPHONE (OUTPATIENT)
Dept: FAMILY MEDICINE CLINIC | Facility: CLINIC | Age: 45
End: 2020-11-03

## 2020-11-03 DIAGNOSIS — H53.8 BLURRY VISION, BILATERAL: Primary | ICD-10-CM

## 2020-11-03 NOTE — TELEPHONE ENCOUNTER
Referral approvd [id number] to [facility] not in network     [address] [facility]

## 2020-11-03 NOTE — TELEPHONE ENCOUNTER
Patient is currently scheduled with Dr. Nadine Juan with January 13th for Eye exam.     Would like referral for then.     Pls advise

## 2020-11-06 ENCOUNTER — TELEPHONE (OUTPATIENT)
Dept: ADMINISTRATIVE | Age: 45
End: 2020-11-06

## 2020-11-06 NOTE — TELEPHONE ENCOUNTER
Rec'd FMLA via fax and called patient for HIPAA. She will go to Dr. Sonja Vallejo office to sign HIPAA. 1st day off 11/25/20

## 2020-11-06 NOTE — TELEPHONE ENCOUNTER
PT arrived at ENT  to fill out and complete JOJO and FCR forms    Pt completed and signed all forms    $25 fee collected    Forms will be scanned and faxed to [facility] for review    Lisbeth Medina will be brought to [facility] [address]

## 2020-11-09 NOTE — TELEPHONE ENCOUNTER
Alfonso Carmona,    Dr. Ignacio Lynn is not contracted with [facility]. Brenden Brown would need to see Dr. Susie Mcdonald. Please advise patient of the change. I will change the referral to Dr. Susie Mcdonald.     Thank you  [facility] Eva

## 2020-11-10 ENCOUNTER — TELEPHONE (OUTPATIENT)
Dept: FAMILY MEDICINE CLINIC | Facility: CLINIC | Age: 45
End: 2020-11-10

## 2020-11-10 NOTE — TELEPHONE ENCOUNTER
STEPHEN wanted to inform her of message below       Hi Dr. Joann Dias is not contracted with [facility] would need to see Dr. Rick Orta.     Please advise patient of the change.     I will change the referral to Dr. Rick Orta.

## 2020-11-10 NOTE — TELEPHONE ENCOUNTER
Pt dropped off additional FMLA forms for JDO to complete and sign    Will scan and email all forms to [employer] to review    M[place] will be brought to [facility] 2nd floor

## 2020-11-13 NOTE — TELEPHONE ENCOUNTER
Dr. Italo Frank,     Please sign off on form:  -Highlight the patient and hit \"Chart\" button.   -In Chart Review, w/in the Encounter tab - click 1 time on the Telephone call encounter for 11/6/20 Scroll down the telephone encounter.  -Click \"scan on\" blue Hy

## 2020-11-22 ENCOUNTER — LAB REQUISITION (OUTPATIENT)
Dept: LAB | Facility: HOSPITAL | Age: 45
End: 2020-11-22
Payer: COMMERCIAL

## 2020-11-22 DIAGNOSIS — Z01.818 ENCOUNTER FOR OTHER PREPROCEDURAL EXAMINATION: ICD-10-CM

## 2020-11-25 ENCOUNTER — TELEPHONE (OUTPATIENT)
Dept: OTOLARYNGOLOGY | Facility: CLINIC | Age: 45
End: 2020-11-25

## 2020-11-25 RX ORDER — CEPHALEXIN 500 MG/1
500 CAPSULE ORAL EVERY 8 HOURS
Qty: 21 CAPSULE | Refills: 0 | Status: SHIPPED | OUTPATIENT
Start: 2020-11-25

## 2020-11-25 RX ORDER — HYDROCODONE BITARTRATE AND ACETAMINOPHEN 7.5; 325 MG/1; MG/1
1 TABLET ORAL EVERY 6 HOURS PRN
Qty: 30 TABLET | Refills: 0 | Status: SHIPPED | OUTPATIENT
Start: 2020-11-25

## 2020-11-25 NOTE — TELEPHONE ENCOUNTER
Per pt is having a lot of pain, had surgery today, and has been unable to get pain medication, requesting to speak to RN.

## 2020-11-25 NOTE — TELEPHONE ENCOUNTER
Informed patient that Rn called in Rx  to pt pharmacy and Rx [place] faxed to pharmacy. patient stated having nosebleed advised pt bleeding  Is expected for first 24 to 72 hours ,pt can use AFRIN nasal spray follow the direction in the bottle only 5 days post

## 2020-11-27 ENCOUNTER — TELEPHONE (OUTPATIENT)
Dept: OTOLARYNGOLOGY | Facility: CLINIC | Age: 45
End: 2020-11-27

## 2020-11-27 NOTE — TELEPHONE ENCOUNTER
Pt is post op  septoplasty, SMR.  Per  Pt pt is doing well no bleeding, advised pt no bending or heavy lifting for the next week and pt is not to blow nose until seen by Dr Tara Perkins pt she can start using OTC saline nasal spray 2  Spray 3-4 x daily or

## 2020-12-03 ENCOUNTER — OFFICE VISIT (OUTPATIENT)
Dept: OTOLARYNGOLOGY | Facility: CLINIC | Age: 45
End: 2020-12-03

## 2020-12-03 VITALS
WEIGHT: 116 LBS | DIASTOLIC BLOOD PRESSURE: 53 MMHG | TEMPERATURE: 98 F | BODY MASS INDEX: 21 KG/M2 | HEART RATE: 74 BPM | SYSTOLIC BLOOD PRESSURE: 94 MMHG

## 2020-12-03 DIAGNOSIS — J34.2 DEVIATED NASAL SEPTUM: Primary | ICD-10-CM

## 2020-12-03 PROCEDURE — 3078F DIAST BP <80 MM HG: CPT | Performed by: OTOLARYNGOLOGY

## 2020-12-03 PROCEDURE — 3074F SYST BP LT 130 MM HG: CPT | Performed by: OTOLARYNGOLOGY

## 2020-12-03 PROCEDURE — 99024 POSTOP FOLLOW-UP VISIT: CPT | Performed by: OTOLARYNGOLOGY

## 2020-12-03 RX ORDER — ESTRADIOL 0.1 MG/G
CREAM VAGINAL
COMMUNITY
Start: 2020-10-20

## 2020-12-03 NOTE — PROGRESS NOTES
Rachel Watkins is a 50year old female. Patient presents with:  Post-Op: Status post septoplasty 11/25/20. Patient recovered well, denies any complaints at this time.        HISTORY OF PRESENT ILLNESS  She presents with a history of being involved in a ca Sexual Activity      Alcohol use: No        Alcohol/week: 0.0 standard drinks      Drug use: No      Sexual activity: Yes    Other Topics      Concerns:        Caffeine Concern: No        Reaction to local anesthetic: No      Family History   Problem Relat Right arm)   Pulse 74   Temp 97.5 °F (36.4 °C) (Tympanic)   Wt 116 lb (52.6 kg)   BMI 20.88 kg/m²        Constitutional Normal Overall appearance - Normal.   Psychiatric Normal Orientation - Oriented to time, place, person & situation.  Appropriate mood and benadryl/lidocaine/mylanta 1:1:1, Take 5-10 mL by mouth 3 (three) times daily before meals. Swish and Smallow or Swish and Spit, Disp: 90 mL, Rfl: 0  •  ergocalciferol 1.25 MG (04931 UT) Oral Cap, Take 1 capsule (50,000 Units total) by mouth once a week. ,

## 2021-01-15 ENCOUNTER — OFFICE VISIT (OUTPATIENT)
Dept: OPTOMETRY | Facility: CLINIC | Age: 46
End: 2021-01-15

## 2021-01-15 DIAGNOSIS — H04.129 DRYNESS OF EYE: Primary | ICD-10-CM

## 2021-01-15 DIAGNOSIS — H52.4 PRESBYOPIA: ICD-10-CM

## 2021-01-15 PROCEDURE — 92002 INTRM OPH EXAM NEW PATIENT: CPT | Performed by: OPTOMETRIST

## 2021-01-15 NOTE — PROGRESS NOTES
Chary Pretty is a 50year old female. HPI:     HPI     Patient is in for an annual eye exam. She wears reading glasses that she lost. She last had an EE here in 2014 and had complaints of dryness. She still has dry eyes with injection.   I recommende Nasal route 2 (two) times daily. 1 Bottle 0   • benadryl/lidocaine/mylanta 1:1:1 Take 5-10 mL by mouth 3 (three) times daily before meals.  Swish and Smallow or Swish and Spit 90 mL 0   • ergocalciferol 1.25 MG (31941 UT) Oral Cap Take 1 capsule (50,000 Uni 1/15/2021  3:09 PM. (History)          PHYSICAL EXAM:     Base Eye Exam     Visual Acuity (Snellen - Linear)       Right Left    Dist sc 20/20 20/20    Near cc 4pt 4pt    Correction: Glasses   Near is with +1.50 hand held           Tonometry (Icare, 3:18 P ordered in this encounter        Follow up instructions:  Return in about 1 year (around 1/15/2022) for Eye Exam.    1/15/2021  Scribed by: Mariza Mendiola

## 2021-01-15 NOTE — PATIENT INSTRUCTIONS
Dryness of eye  I recommend that she use artificial tears again. I also gave her Dr. Toyin Olivo office number if she wants a consult and punctal plugs. Patient can use Lumify to help clear the redness ou.     Presbyopia  Patient can use +1.50 or +1.75 for nearw

## 2021-01-15 NOTE — ASSESSMENT & PLAN NOTE
I recommend that she use artificial tears again. I also gave her Dr. Juana Figueredo office number if she wants a consult and punctal plugs. Patient can use Lumify to help clear the redness ou.

## 2021-01-22 ENCOUNTER — NURSE TRIAGE (OUTPATIENT)
Dept: FAMILY MEDICINE CLINIC | Facility: CLINIC | Age: 46
End: 2021-01-22

## 2021-01-22 DIAGNOSIS — Z20.822 ENCOUNTER BY TELEHEALTH FOR SUSPECTED COVID-19: Primary | ICD-10-CM

## 2021-01-22 NOTE — TELEPHONE ENCOUNTER
Action Requested: Summary for Provider     []  Critical Lab, Recommendations Needed  [] Need Additional Advice  []   FYI    []   Need Orders  [] Need Medications Sent to Pharmacy  []  Other     SUMMARY:  Per protocol advised to speak with PCP.   Pended Covi

## 2021-01-23 NOTE — TELEPHONE ENCOUNTER
I am not in the office Fridays  Just saw this message  Can add as virtual tomorrow to my schedule (doximity) or another provider if available  Will try to reach her when I get a chance

## 2021-01-26 ENCOUNTER — VIRTUAL PHONE E/M (OUTPATIENT)
Dept: FAMILY MEDICINE CLINIC | Facility: CLINIC | Age: 46
End: 2021-01-26

## 2021-01-26 DIAGNOSIS — R68.89 FLU-LIKE SYMPTOMS: Primary | ICD-10-CM

## 2021-01-26 PROCEDURE — 99213 OFFICE O/P EST LOW 20 MIN: CPT | Performed by: FAMILY MEDICINE

## 2021-01-26 NOTE — PROGRESS NOTES
HPI:    Patient ID: Kailey Chaudhari is a 50year old female. Virtual Telephone Check-In    Kailey Chaudhari verbally consents to a Virtual/Telephone Check-In visit on 01/26/21.   Patient has been referred to the [facility] website at [email or web address] to Reported on 1/15/2021 ) 30 tablet 3   • loratadine 10 MG Oral Tab Take 1 tablet (10 mg total) by mouth daily. (Patient not taking: Reported on 1/15/2021 ) 30 tablet 3   • Azelastine HCl 0.1 % Nasal Solution 2 sprays by Nasal route 2 (two) times daily.  [address]

## 2021-01-27 ENCOUNTER — LAB ENCOUNTER (OUTPATIENT)
Dept: LAB | Facility: HOSPITAL | Age: 46
End: 2021-01-27
Attending: FAMILY MEDICINE
Payer: COMMERCIAL

## 2021-01-27 DIAGNOSIS — R68.89 FLU-LIKE SYMPTOMS: ICD-10-CM

## 2021-01-28 LAB — SARS-COV-2 RNA RESP QL NAA+PROBE: NOT DETECTED

## 2021-09-17 ENCOUNTER — LAB ENCOUNTER (OUTPATIENT)
Dept: LAB | Facility: HOSPITAL | Age: 46
End: 2021-09-17
Attending: OBSTETRICS & GYNECOLOGY
Payer: COMMERCIAL

## 2021-09-17 ENCOUNTER — OFFICE VISIT (OUTPATIENT)
Dept: OBGYN CLINIC | Facility: CLINIC | Age: 46
End: 2021-09-17

## 2021-09-17 VITALS
WEIGHT: 108.38 LBS | DIASTOLIC BLOOD PRESSURE: 63 MMHG | BODY MASS INDEX: 20 KG/M2 | SYSTOLIC BLOOD PRESSURE: 100 MMHG | HEART RATE: 69 BPM

## 2021-09-17 DIAGNOSIS — L65.9 HAIR LOSS: ICD-10-CM

## 2021-09-17 DIAGNOSIS — N76.0 VAGINITIS AND VULVOVAGINITIS: ICD-10-CM

## 2021-09-17 DIAGNOSIS — R23.2 HOT FLASHES: ICD-10-CM

## 2021-09-17 DIAGNOSIS — Z12.31 VISIT FOR SCREENING MAMMOGRAM: ICD-10-CM

## 2021-09-17 DIAGNOSIS — Z01.411 ENCOUNTER FOR GYNECOLOGICAL EXAMINATION WITH ABNORMAL FINDING: Primary | ICD-10-CM

## 2021-09-17 LAB — TSI SER-ACNC: 2.19 MIU/ML (ref 0.36–3.74)

## 2021-09-17 PROCEDURE — 36415 COLL VENOUS BLD VENIPUNCTURE: CPT

## 2021-09-17 PROCEDURE — 99396 PREV VISIT EST AGE 40-64: CPT | Performed by: OBSTETRICS & GYNECOLOGY

## 2021-09-17 PROCEDURE — 99213 OFFICE O/P EST LOW 20 MIN: CPT | Performed by: OBSTETRICS & GYNECOLOGY

## 2021-09-17 PROCEDURE — 3078F DIAST BP <80 MM HG: CPT | Performed by: OBSTETRICS & GYNECOLOGY

## 2021-09-17 PROCEDURE — 84443 ASSAY THYROID STIM HORMONE: CPT

## 2021-09-17 PROCEDURE — 3074F SYST BP LT 130 MM HG: CPT | Performed by: OBSTETRICS & GYNECOLOGY

## 2021-09-17 NOTE — PROGRESS NOTES
Ari Sung is a 48year old female  No LMP recorded. Patient is premenopausal. Patient presents with:  Gyn Exam: Annual exam -- last see in 2019  Vaginal Problem: discharge w/ odor & itch x 2 weeks  Other: bad headache x 1-2 days every month. Stroke Father 58   • Prostate Cancer Father    • Cancer Father         prostate cancer   • Lipids Father    • Heart Disorder Father    • Hypertension Father    • Diabetes Neg    • Glaucoma Neg        MEDICATIONS:    Current Outpatient Medications:   •  Aze by mouth daily.  1 po qd (Patient not taking: No sig reported), Disp: 10 tablet, Rfl: 0  •  triamcinolone acetonide 0.5 % External Cream, Use bid as directed (Patient not taking: No sig reported), Disp: 30 g, Rfl: 1  •  ibuprofen 600 MG Oral Tab, Take 1 tab appearance without lesions, no abnormal discharge  Cervix:    normal without tenderness on motion  Uterus:    normal in size, contour, position, mobility, without tenderness  Adnexa:   normal without masses or tenderness  Perineum:   normal  Anus: no hemor

## 2021-09-19 LAB
GENITAL VAGINOSIS SCREEN: POSITIVE
TRICHOMONAS SCREEN: NEGATIVE

## 2021-09-21 ENCOUNTER — TELEPHONE (OUTPATIENT)
Dept: OBGYN CLINIC | Facility: CLINIC | Age: 46
End: 2021-09-21

## 2021-09-21 RX ORDER — METRONIDAZOLE 500 MG/1
TABLET ORAL
Qty: 14 TABLET | Refills: 0 | Status: SHIPPED | OUTPATIENT
Start: 2021-09-21

## 2021-09-21 NOTE — TELEPHONE ENCOUNTER
Informed pt that culture positive for BV. Rx sent for Flagyl 500mg , by mouth twice a day x 7 days. Pt stated understanding.

## 2021-10-04 ENCOUNTER — TELEPHONE (OUTPATIENT)
Dept: OBGYN CLINIC | Facility: CLINIC | Age: 46
End: 2021-10-04

## 2021-10-04 NOTE — TELEPHONE ENCOUNTER
----- Message from Chip Valiente MD sent at 9/27/2021 11:53 AM CDT -----  Please call pt and inform her of results attached

## 2022-05-04 ENCOUNTER — OFFICE VISIT (OUTPATIENT)
Dept: FAMILY MEDICINE CLINIC | Facility: CLINIC | Age: 47
End: 2022-05-04
Payer: COMMERCIAL

## 2022-05-04 VITALS
SYSTOLIC BLOOD PRESSURE: 93 MMHG | TEMPERATURE: 97 F | HEART RATE: 76 BPM | DIASTOLIC BLOOD PRESSURE: 60 MMHG | HEIGHT: 62.5 IN | WEIGHT: 113 LBS | BODY MASS INDEX: 20.28 KG/M2

## 2022-05-04 DIAGNOSIS — M21.611 BUNION OF GREAT TOE OF RIGHT FOOT: Primary | ICD-10-CM

## 2022-05-04 PROCEDURE — 3078F DIAST BP <80 MM HG: CPT | Performed by: FAMILY MEDICINE

## 2022-05-04 PROCEDURE — 3074F SYST BP LT 130 MM HG: CPT | Performed by: FAMILY MEDICINE

## 2022-05-04 PROCEDURE — 3008F BODY MASS INDEX DOCD: CPT | Performed by: FAMILY MEDICINE

## 2022-05-04 PROCEDURE — 99213 OFFICE O/P EST LOW 20 MIN: CPT | Performed by: FAMILY MEDICINE

## 2022-05-06 ENCOUNTER — HOSPITAL ENCOUNTER (OUTPATIENT)
Dept: GENERAL RADIOLOGY | Age: 47
Discharge: HOME | End: 2022-05-06
Attending: FAMILY MEDICINE
Payer: COMMERCIAL

## 2022-05-06 DIAGNOSIS — M21.611 BUNION OF GREAT TOE OF RIGHT FOOT: ICD-10-CM

## 2022-05-06 PROCEDURE — 73630 X-RAY EXAM OF FOOT: CPT | Performed by: FAMILY MEDICINE

## 2022-05-10 ENCOUNTER — TELEPHONE (OUTPATIENT)
Dept: FAMILY MEDICINE CLINIC | Facility: CLINIC | Age: 47
End: 2022-05-10

## 2022-05-10 NOTE — TELEPHONE ENCOUNTER
LVMTCB wanted to inform her of Dr. Yusuf Andersen message below    Foot xray shows no acute fracture  Hypertrophic bony changes  Follow up with podiatry  Please inform patient

## 2023-01-09 ENCOUNTER — NURSE TRIAGE (OUTPATIENT)
Dept: FAMILY MEDICINE CLINIC | Facility: CLINIC | Age: 48
End: 2023-01-09

## 2023-01-11 ENCOUNTER — OFFICE VISIT (OUTPATIENT)
Dept: FAMILY MEDICINE CLINIC | Facility: CLINIC | Age: 48
End: 2023-01-11

## 2023-01-11 VITALS
SYSTOLIC BLOOD PRESSURE: 92 MMHG | WEIGHT: 113 LBS | TEMPERATURE: 98 F | HEIGHT: 62.5 IN | BODY MASS INDEX: 20.28 KG/M2 | DIASTOLIC BLOOD PRESSURE: 55 MMHG

## 2023-01-11 DIAGNOSIS — J34.89 SINUS PRESSURE: ICD-10-CM

## 2023-01-11 DIAGNOSIS — Z86.69 HISTORY OF MIGRAINE HEADACHES: ICD-10-CM

## 2023-01-11 DIAGNOSIS — R51.9 FRONTAL HEADACHE: Primary | ICD-10-CM

## 2023-01-11 PROCEDURE — 3074F SYST BP LT 130 MM HG: CPT | Performed by: FAMILY MEDICINE

## 2023-01-11 PROCEDURE — 3078F DIAST BP <80 MM HG: CPT | Performed by: FAMILY MEDICINE

## 2023-01-11 PROCEDURE — 99214 OFFICE O/P EST MOD 30 MIN: CPT | Performed by: FAMILY MEDICINE

## 2023-01-11 PROCEDURE — 3008F BODY MASS INDEX DOCD: CPT | Performed by: FAMILY MEDICINE

## 2023-01-11 RX ORDER — SUMATRIPTAN 50 MG/1
50 TABLET, FILM COATED ORAL EVERY 2 HOUR PRN
Qty: 9 TABLET | Refills: 0 | Status: SHIPPED | OUTPATIENT
Start: 2023-01-11

## 2023-01-11 RX ORDER — AMOXICILLIN 875 MG/1
875 TABLET, COATED ORAL 2 TIMES DAILY
Qty: 20 TABLET | Refills: 0 | Status: SHIPPED | OUTPATIENT
Start: 2023-01-11 | End: 2023-01-21

## 2023-01-13 ENCOUNTER — OFFICE VISIT (OUTPATIENT)
Dept: OBGYN CLINIC | Facility: CLINIC | Age: 48
End: 2023-01-13

## 2023-01-13 VITALS
SYSTOLIC BLOOD PRESSURE: 94 MMHG | HEART RATE: 71 BPM | BODY MASS INDEX: 20 KG/M2 | WEIGHT: 112 LBS | DIASTOLIC BLOOD PRESSURE: 63 MMHG

## 2023-01-13 DIAGNOSIS — N95.2 ATROPHIC VAGINITIS: ICD-10-CM

## 2023-01-13 DIAGNOSIS — Z12.31 VISIT FOR SCREENING MAMMOGRAM: ICD-10-CM

## 2023-01-13 DIAGNOSIS — Z01.411 ENCOUNTER FOR GYNECOLOGICAL EXAMINATION WITH ABNORMAL FINDING: Primary | ICD-10-CM

## 2023-01-13 PROCEDURE — 99396 PREV VISIT EST AGE 40-64: CPT | Performed by: OBSTETRICS & GYNECOLOGY

## 2023-01-13 PROCEDURE — 3078F DIAST BP <80 MM HG: CPT | Performed by: OBSTETRICS & GYNECOLOGY

## 2023-01-13 PROCEDURE — 3074F SYST BP LT 130 MM HG: CPT | Performed by: OBSTETRICS & GYNECOLOGY

## 2023-01-13 RX ORDER — ESTRADIOL 0.1 MG/G
0.5 CREAM VAGINAL DAILY
Qty: 42.5 G | Refills: 1 | Status: SHIPPED | OUTPATIENT
Start: 2023-01-13

## 2023-01-16 LAB — HPV I/H RISK 1 DNA SPEC QL NAA+PROBE: NEGATIVE

## 2023-05-12 ENCOUNTER — TELEPHONE (OUTPATIENT)
Dept: FAMILY MEDICINE CLINIC | Facility: CLINIC | Age: 48
End: 2023-05-12

## 2023-07-05 ENCOUNTER — TELEPHONE (OUTPATIENT)
Dept: FAMILY MEDICINE CLINIC | Facility: CLINIC | Age: 48
End: 2023-07-05

## 2023-07-05 DIAGNOSIS — L81.9 HYPOPIGMENTATION: Primary | ICD-10-CM

## 2023-07-05 NOTE — TELEPHONE ENCOUNTER
Pt states has white spots on her face and this has happened in the past.  Per pt and per chart pt has been treated by Dr Wendy Lopez in the past for same symptoms. Pt states spots are itching and requesting a referral to see dermatology. Please advise.  Referral pended for review

## 2023-07-05 NOTE — TELEPHONE ENCOUNTER
-Amlodipine 10  -Coreg 12.5 BID  -Hydralazine 25 Q8H  -Imdur 60  -Continue to monitor     Advised patient of Dr Zendejas Anger note. Patient verbalized understanding.

## 2023-07-27 ENCOUNTER — OFFICE VISIT (OUTPATIENT)
Dept: DERMATOLOGY CLINIC | Facility: CLINIC | Age: 48
End: 2023-07-27

## 2023-07-27 DIAGNOSIS — B36.0 TINEA VERSICOLOR: Primary | ICD-10-CM

## 2023-07-27 DIAGNOSIS — L30.9 DERMATITIS: ICD-10-CM

## 2023-07-27 DIAGNOSIS — L81.9 HYPOPIGMENTATION: ICD-10-CM

## 2023-07-27 PROCEDURE — 99203 OFFICE O/P NEW LOW 30 MIN: CPT | Performed by: DERMATOLOGY

## 2023-07-27 RX ORDER — TRIAMCINOLONE ACETONIDE 5 MG/G
CREAM TOPICAL
Qty: 30 G | Refills: 1 | Status: SHIPPED | OUTPATIENT
Start: 2023-07-27

## 2023-07-27 RX ORDER — CLOTRIMAZOLE 1 %
CREAM (GRAM) TOPICAL
Qty: 30 G | Refills: 1 | Status: SHIPPED | OUTPATIENT
Start: 2023-07-27

## 2023-07-27 RX ORDER — FLUCONAZOLE 100 MG/1
100 TABLET ORAL DAILY
Qty: 10 TABLET | Refills: 0 | Status: SHIPPED | OUTPATIENT
Start: 2023-07-27

## 2023-08-01 ENCOUNTER — OFFICE VISIT (OUTPATIENT)
Dept: FAMILY MEDICINE CLINIC | Facility: CLINIC | Age: 48
End: 2023-08-01

## 2023-08-01 VITALS
DIASTOLIC BLOOD PRESSURE: 80 MMHG | HEIGHT: 62.5 IN | HEART RATE: 66 BPM | BODY MASS INDEX: 20.28 KG/M2 | SYSTOLIC BLOOD PRESSURE: 124 MMHG | OXYGEN SATURATION: 98 % | WEIGHT: 113 LBS

## 2023-08-01 DIAGNOSIS — E28.39 PREMATURE OVARIAN FAILURE: ICD-10-CM

## 2023-08-01 DIAGNOSIS — R53.83 FATIGUE, UNSPECIFIED TYPE: Primary | ICD-10-CM

## 2023-08-01 PROCEDURE — 3074F SYST BP LT 130 MM HG: CPT | Performed by: FAMILY MEDICINE

## 2023-08-01 PROCEDURE — 3079F DIAST BP 80-89 MM HG: CPT | Performed by: FAMILY MEDICINE

## 2023-08-01 PROCEDURE — 99213 OFFICE O/P EST LOW 20 MIN: CPT | Performed by: FAMILY MEDICINE

## 2023-08-01 PROCEDURE — 3008F BODY MASS INDEX DOCD: CPT | Performed by: FAMILY MEDICINE

## 2023-08-05 ENCOUNTER — LAB ENCOUNTER (OUTPATIENT)
Dept: LAB | Age: 48
End: 2023-08-05
Attending: FAMILY MEDICINE
Payer: COMMERCIAL

## 2023-08-05 DIAGNOSIS — R53.83 FATIGUE, UNSPECIFIED TYPE: ICD-10-CM

## 2023-08-05 LAB
ANION GAP SERPL CALC-SCNC: 4 MMOL/L (ref 0–18)
BASOPHILS # BLD AUTO: 0.05 X10(3) UL (ref 0–0.2)
BASOPHILS NFR BLD AUTO: 1.1 %
BUN BLD-MCNC: 16 MG/DL (ref 7–18)
BUN/CREAT SERPL: 16.3 (ref 10–20)
CALCIUM BLD-MCNC: 9.8 MG/DL (ref 8.5–10.1)
CHLORIDE SERPL-SCNC: 108 MMOL/L (ref 98–112)
CO2 SERPL-SCNC: 27 MMOL/L (ref 21–32)
CREAT BLD-MCNC: 0.98 MG/DL
DEPRECATED HBV CORE AB SER IA-ACNC: 73.1 NG/ML
DEPRECATED RDW RBC AUTO: 40 FL (ref 35.1–46.3)
EGFRCR SERPLBLD CKD-EPI 2021: 71 ML/MIN/1.73M2 (ref 60–?)
EOSINOPHIL # BLD AUTO: 0.1 X10(3) UL (ref 0–0.7)
EOSINOPHIL NFR BLD AUTO: 2.1 %
ERYTHROCYTE [DISTWIDTH] IN BLOOD BY AUTOMATED COUNT: 12.1 % (ref 11–15)
FASTING STATUS PATIENT QL REPORTED: YES
GLUCOSE BLD-MCNC: 90 MG/DL (ref 70–99)
HCT VFR BLD AUTO: 45.2 %
HGB BLD-MCNC: 15 G/DL
IMM GRANULOCYTES # BLD AUTO: 0.01 X10(3) UL (ref 0–1)
IMM GRANULOCYTES NFR BLD: 0.2 %
IRON SATN MFR SERPL: 25 %
IRON SERPL-MCNC: 93 UG/DL
LYMPHOCYTES # BLD AUTO: 1.71 X10(3) UL (ref 1–4)
LYMPHOCYTES NFR BLD AUTO: 36.5 %
MCH RBC QN AUTO: 29.5 PG (ref 26–34)
MCHC RBC AUTO-ENTMCNC: 33.2 G/DL (ref 31–37)
MCV RBC AUTO: 89 FL
MONOCYTES # BLD AUTO: 0.28 X10(3) UL (ref 0.1–1)
MONOCYTES NFR BLD AUTO: 6 %
NEUTROPHILS # BLD AUTO: 2.53 X10 (3) UL (ref 1.5–7.7)
NEUTROPHILS # BLD AUTO: 2.53 X10(3) UL (ref 1.5–7.7)
NEUTROPHILS NFR BLD AUTO: 54.1 %
OSMOLALITY SERPL CALC.SUM OF ELEC: 289 MOSM/KG (ref 275–295)
PLATELET # BLD AUTO: 268 10(3)UL (ref 150–450)
POTASSIUM SERPL-SCNC: 4 MMOL/L (ref 3.5–5.1)
RBC # BLD AUTO: 5.08 X10(6)UL
SODIUM SERPL-SCNC: 139 MMOL/L (ref 136–145)
TIBC SERPL-MCNC: 373 UG/DL (ref 240–450)
TRANSFERRIN SERPL-MCNC: 250 MG/DL (ref 200–360)
TSI SER-ACNC: 2.69 MIU/ML (ref 0.36–3.74)
VIT B12 SERPL-MCNC: 769 PG/ML (ref 193–986)
VIT D+METAB SERPL-MCNC: 27 NG/ML (ref 30–100)
WBC # BLD AUTO: 4.7 X10(3) UL (ref 4–11)

## 2023-08-05 PROCEDURE — 36415 COLL VENOUS BLD VENIPUNCTURE: CPT

## 2023-08-05 PROCEDURE — 80048 BASIC METABOLIC PNL TOTAL CA: CPT

## 2023-08-05 PROCEDURE — 85025 COMPLETE CBC W/AUTO DIFF WBC: CPT

## 2023-08-05 PROCEDURE — 82607 VITAMIN B-12: CPT

## 2023-08-05 PROCEDURE — 82306 VITAMIN D 25 HYDROXY: CPT

## 2023-08-05 PROCEDURE — 84443 ASSAY THYROID STIM HORMONE: CPT

## 2023-08-05 PROCEDURE — 84466 ASSAY OF TRANSFERRIN: CPT

## 2023-08-05 PROCEDURE — 83540 ASSAY OF IRON: CPT

## 2023-08-05 PROCEDURE — 82728 ASSAY OF FERRITIN: CPT

## 2023-08-06 NOTE — PROGRESS NOTES
Romie Frank is a 50year old female. Patient presents with:  Lesion: No hx of skin ca. LOV 3/2020. Pt presents for white areas to the face with itchiness. Worse with warm weather ongoing for about 1 month. Pt is using sunscreen with daily moisturizer. Denies antihistamines. Patient has no known allergies. Current Outpatient Medications   Medication Sig Dispense Refill    triamcinolone 0.5 % External Cream Use bid as directed 30 g 1    fluconazole 100 MG Oral Tab Take 1 tablet (100 mg total) by mouth daily. 10 tablet 0    clotrimazole 1 % External Cream Use bid to spot on (face for fungus) if rash returns 30 g 1    estradiol 0.1 MG/GM Vaginal Cream Place 0.5 g vaginally daily. Use nightly x 2 weeks then every Monday / Thursday night 42.5 g 1    SUMAtriptan (IMITREX) 50 MG Oral Tab Take 1 tablet (50 mg total) by mouth every 2 (two) hours as needed for Migraine. Use at onset; repeat once after 2 HRS-ONLY 2 IN 24 HR MAX 9 tablet 0    traMADol HCl 50 MG Oral Tab Take 1 tablet (50 mg total) by mouth every 6 (six) hours as needed for Pain. 30 tablet 0    Montelukast Sodium 10 MG Oral Tab Take 1 tablet (10 mg total) by mouth nightly. 30 tablet 3    loratadine 10 MG Oral Tab Take 1 tablet (10 mg total) by mouth daily. 30 tablet 3    Azelastine HCl 0.1 % Nasal Solution 2 sprays by Nasal route 2 (two) times daily. 1 Bottle 0    benadryl/lidocaine/mylanta 1:1:1 Take 5-10 mL by mouth 3 (three) times daily before meals. Swish and Smallow or Swish and Spit 90 mL 0    ibuprofen 600 MG Oral Tab Take 1 tablet (600 mg total) by mouth every 6 (six) hours as needed for Pain. 30 tablet 0      Past Medical History:   Diagnosis Date    Amenorrhea 2010    Amenorrhea due to [facility], hysteroscopy - operative 10/2010    Depression     resolved    Fibroid     hysteroscopy - operative 2004    History of pregnancy     SAB x 5, D&C x 4;  molar pregn. ..chemoRx after    Infertility     laparoscopy-diagnostic 2005 Irregular menstrual cycle     neg endometrial biopsy 2014    Lipid screening 4/13/2013    Mole of pregnancy 2003    Chemo Therapy     Premature ovarian failure 2013      Social History:  Social History     Socioeconomic History    Marital status: Single   Tobacco Use    Smoking status: Never    Smokeless tobacco: Never   Vaping Use    Vaping Use: Never used   Substance and Sexual Activity    Alcohol use: No     Alcohol/week: 0.0 standard drinks of alcohol    Drug use: No    Sexual activity: Yes   Other Topics Concern    Caffeine Concern No    Grew up on a farm No    History of tanning No    Outdoor occupation No    Breast feeding No    Reaction to local anesthetic No    Pt has a pacemaker No    Pt has a defibrillator No                 Current Outpatient Medications   Medication Sig Dispense Refill    triamcinolone 0.5 % External Cream Use bid as directed 30 g 1    fluconazole 100 MG Oral Tab Take 1 tablet (100 mg total) by mouth daily. 10 tablet 0    clotrimazole 1 % External Cream Use bid to spot on (face for fungus) if rash returns 30 g 1    estradiol 0.1 MG/GM Vaginal Cream Place 0.5 g vaginally daily. Use nightly x 2 weeks then every Monday / Thursday night 42.5 g 1    SUMAtriptan (IMITREX) 50 MG Oral Tab Take 1 tablet (50 mg total) by mouth every 2 (two) hours as needed for Migraine. Use at onset; repeat once after 2 HRS-ONLY 2 IN 24 HR MAX 9 tablet 0    traMADol HCl 50 MG Oral Tab Take 1 tablet (50 mg total) by mouth every 6 (six) hours as needed for Pain. 30 tablet 0    Montelukast Sodium 10 MG Oral Tab Take 1 tablet (10 mg total) by mouth nightly. 30 tablet 3    loratadine 10 MG Oral Tab Take 1 tablet (10 mg total) by mouth daily. 30 tablet 3    Azelastine HCl 0.1 % Nasal Solution 2 sprays by Nasal route 2 (two) times daily. 1 Bottle 0    benadryl/lidocaine/mylanta 1:1:1 Take 5-10 mL by mouth 3 (three) times daily before meals.  Swish and Smallow or Swish and Spit 90 mL 0    ibuprofen 600 MG Oral Tab Take 1 tablet (600 mg total) by mouth every 6 (six) hours as needed for Pain. 30 tablet 0     Allergies:   No Known Allergies    Past Medical History:   Diagnosis Date    Amenorrhea 2010    Amenorrhea due to [facility], hysteroscopy - operative 10/2010    Depression     resolved    Fibroid     hysteroscopy - operative 2004    History of pregnancy     SAB x 5, D&C x 4;  molar pregn. ..chemoRx after    Infertility     laparoscopy-diagnostic 2005    Irregular menstrual cycle     neg endometrial biopsy 2014    Lipid screening 4/13/2013    Mole of pregnancy 2003    Chemo Therapy     Premature ovarian failure 2013     Past Surgical History:   Procedure Laterality Date    BIOPSY OF UTERUS LINING  2014    neg endometrial biopsy    D & C      x 4    HYSTEROSCOPY  2004    operative    LAPAROSCOPY,DIAGNOSTIC  2005, 10/2010    REPAIR OF NASAL SEPTUM  11/25/2020    Septoplasty and SMR      Social History    Socioeconomic History      Marital status: Single      Spouse name: Not on file      Number of children: Not on file      Years of education: Not on file      Highest education level: Not on file    Occupational History      Not on file    Tobacco Use      Smoking status: Never      Smokeless tobacco: Never    Vaping Use      Vaping Use: Never used    Substance and Sexual Activity      Alcohol use: No        Alcohol/week: 0.0 standard drinks of alcohol      Drug use: No      Sexual activity: Yes    Other Topics      Concerns:         Service: Not Asked        Blood Transfusions: Not Asked        Caffeine Concern: No        Occupational Exposure: Not Asked        Hobby Hazards: Not Asked        Sleep Concern: Not Asked        Stress Concern: Not Asked        Weight Concern: Not Asked        Special Diet: Not Asked        Back Care: Not Asked        Exercise: Not Asked        Bike Helmet: Not Asked        Seat Belt: Not Asked        Self-Exams: Not Asked        Grew up on a farm: No        History of tanning: No        Outdoor occupation: No        Breast feeding: No        Reaction to local anesthetic: No        Pt has a pacemaker: No        Pt has a defibrillator: No    Social History Narrative      Not on file    Social Determinants of Health  Financial Resource Strain: Not on file  Food Insecurity: Not on file  Transportation Needs: Not on file  Physical Activity: Not on file  Stress: Not on file  Social Connections: Not on file  Housing Stability: Not on file  Family History   Problem Relation Age of Onset    Heart Disease Father     High Blood Pressure Father     Stroke Father 58    Prostate Cancer Father     Cancer Father         prostate cancer    Lipids Father     Heart Disorder Father     Hypertension Father     Diabetes Neg     Glaucoma Neg                       HPI :      Patient presents with:  Lesion: No hx of skin ca. LOV 3/2020. Pt presents for white areas to the face with itchiness. Worse with warm weather ongoing for about 1 month. Pt is using sunscreen with daily moisturizer. Denies antihistamines. Patient with hypopigmentation on face that improved previously with fluconazole, topicals. Started flaring recently since the warmer weather. Does use sunscreen, no feels really new or different. No other concerns  Patient presents with concerns above. Past notes/ records and appropriate/relevant lab results including pathology and past body maps reviewed. Updated and new information noted in current visit. ROS:    Denies any other systemic complaints. No fevers, chills, night sweats, sensitivity to the sun, deeper lumps or bumps. No other skin complaints. History, medications, allergies as noted. Physical examination: Patient  well-developed well-nourished, alert oriented in no acute distress.   Exam of involved, appropriate areas of skin performed, including scalp, head, neck, face,nails, hair, external eyes, including conjunctival mucosa, eyelids, lips, external ears, back, chest, abdomen, arms, legs, palms. Remarkable for lesions as noted   Hypopigmented scaly patches over the forehead brows cheeks nasolabial fold background of eczematous changes     ASSESSMENT AND PLAN:     Tinea versicolor  (primary encounter diagnosis)  Hypopigmentation  Dermatitis    Assessment / plan:      Dermatitis. Meds in grid. Skin care instructions reviewed. [place] use of emollients. Pathophysiology reviewed. Consider Contac allergy in differential.  Consider patch testing. Patient will let us know how they are doing over the next several weeks. Await clinical response to above therapies. Tinea versicolor like changes over the forehead brows nasolabial folds possible overlap with seborrheic dermatitis      Tinea versicolor:Pathophysiology discussed with patient. Therapeutic options reviewed. See  Medications in grid. Instructions reviewed at length. Chronic recurrent nature discussed. Likely it will take time for dyspigmentation to resolve reviewed. Not contagious. Clotrimazole to scaly areas triamcinolone cream to active more scaly, red areas, fluconazole orally follow-up if not improving. Not currently taking her antihistamines or allergy medications may be helpful to resume these. History of vitamin D deficiency continue moisturizer    No other susupicious lesions on todays  exam.    Pathophysiology discussed with patient. Therapeutic options reviewed. See  Medications in grid. Instructions reviewed at length. General skin care questions answered. Reassurance regarding benign skin lesions. Signs and symptoms of skin cancer, ABCDE's of melanoma briefly reviewed. Sunscreen use, sun protection, encouraged. Followup as noted in rtc or p.r.n.     Encounter Times  Including precharting, reviewing chart, prior notes obtaining history: 5 minutes, medical exam :10 minutes, notes on body map, plan, counseling 10minutes My total time spent caring for the patient on the day of the encounter: 25 minutes The patient indicates understanding of these issues and agrees to the plan. The patient is asked to return as noted in follow-up /as noted above    This note was generated using Dragon voice recognition software. Please contact me regarding any confusion resulting from errors in recognition. Note to patient and family: The [facility] makes medical notes like these available to patients. However, be advised this is a medical document. It is intended as [id number] communication and monitoring of a patient's care needs. It is written in medical language and may contain abbreviations or verbiage that are unfamiliar. It may appear blunt or direct. Medical documents are intended to carry relevant information, facts as evident and the clinical opinion of the practitioner. No orders of the defined types were placed in this encounter. Meds & Refills for this Visit:   Requested Prescriptions     Signed Prescriptions Disp Refills    triamcinolone 0.5 % External Cream 30 g 1     Sig: Use bid as directed    fluconazole 100 MG Oral Tab 10 tablet 0     Sig: Take 1 tablet (100 mg total) by mouth daily. clotrimazole 1 % External Cream 30 g 1     Sig: Use bid to spot on (face for fungus) if rash returns       Tinea versicolor  (primary encounter diagnosis)  Hypopigmentation  Dermatitis    No orders of the defined types were placed in this encounter.       Results From Past 48 Hours:  Recent Results (from the past 48 hour(s))   IRON AND TIBC    Collection Time: 08/05/23  8:53 AM   Result Value Ref Range    Iron 93 50 - 170 ug/dL    Transferrin 250 200 - 360 mg/dL    Total Iron Binding Capacity 373 240 - 450 ug/dL    % Saturation 25 15 - 50 %   FERRITIN    Collection Time: 08/05/23  8:53 AM   Result Value Ref Range    Ferritin 73.1 12.0 - 240.0 ng/mL   TSH W REFLEX TO FREE T4    Collection Time: 08/05/23  8:53 AM   Result Value Ref Range    TSH 2.690 0.358 - 3.740 mIU/mL   VITAMIN B12    Collection Time: 08/05/23  8:53 AM   Result Value Ref Range    Vitamin B12 769 193 - 986 pg/mL   BASIC METABOLIC PANEL (8)    Collection Time: 08/05/23  8:53 AM   Result Value Ref Range    Glucose 90 70 - 99 mg/dL    Sodium 139 136 - 145 mmol/L    Potassium 4.0 3.5 - 5.1 mmol/L    Chloride 108 98 - 112 mmol/L    CO2 27.0 21.0 - 32.0 mmol/L    Anion Gap 4 0 - 18 mmol/L    BUN 16 7 - 18 mg/dL    Creatinine 0.98 0.55 - 1.02 mg/dL    BUN/CREA Ratio 16.3 10.0 - 20.0    Calcium, Total 9.8 8.5 - 10.1 mg/dL    Calculated Osmolality 289 275 - 295 mOsm/kg    eGFR-Cr 71 >=60 mL/min/1.73m2    Patient Fasting for BMP? Yes    CBC W/ DIFFERENTIAL    Collection Time: 08/05/23  8:53 AM   Result Value Ref Range    WBC 4.7 4.0 - 11.0 x10(3) uL    RBC 5.08 3.80 - 5.30 x10(6)uL    HGB 15.0 12.0 - 16.0 g/dL    HCT 45.2 35.0 - 48.0 %    MCV 89.0 80.0 - 100.0 fL    MCH 29.5 26.0 - 34.0 pg    MCHC 33.2 31.0 - 37.0 g/dL    RDW-SD 40.0 35.1 - 46.3 fL    RDW 12.1 11.0 - 15.0 %    .0 150.0 - 450.0 10(3)uL    Neutrophil Absolute Prelim 2.53 1.50 - 7.70 x10 (3) uL    Neutrophil Absolute 2.53 1.50 - 7.70 x10(3) uL    Lymphocyte Absolute 1.71 1.00 - 4.00 x10(3) uL    Monocyte Absolute 0.28 0.10 - 1.00 x10(3) uL    Eosinophil Absolute 0.10 0.00 - 0.70 x10(3) uL    Basophil Absolute 0.05 0.00 - 0.20 x10(3) uL    Immature Granulocyte Absolute 0.01 0.00 - 1.00 x10(3) uL    Neutrophil % 54.1 %    Lymphocyte % 36.5 %    Monocyte % 6.0 %    Eosinophil % 2.1 %    Basophil % 1.1 %    Immature Granulocyte % 0.2 %   VITAMIN D, 25-HYDROXY    Collection Time: 08/05/23  8:53 AM   Result Value Ref Range    Vitamin D, 25OH, Total 27.0 (L) 30.0 - 100.0 ng/mL       Meds This Visit:      Imaging Orders:  None     Referral Orders:  No orders of the defined types were placed in this encounter.

## 2023-09-22 ENCOUNTER — HOSPITAL ENCOUNTER (OUTPATIENT)
Age: 48
Discharge: HOME | End: 2023-09-22
Payer: COMMERCIAL

## 2023-09-22 VITALS
RESPIRATION RATE: 18 BRPM | OXYGEN SATURATION: 100 % | SYSTOLIC BLOOD PRESSURE: 118 MMHG | DIASTOLIC BLOOD PRESSURE: 67 MMHG | HEART RATE: 73 BPM | TEMPERATURE: 98 F

## 2023-09-22 DIAGNOSIS — J02.9 VIRAL PHARYNGITIS: Primary | ICD-10-CM

## 2023-09-22 DIAGNOSIS — Z11.52 ENCOUNTER FOR SCREENING FOR COVID-19: ICD-10-CM

## 2023-09-22 DIAGNOSIS — R51.9 ACUTE NONINTRACTABLE HEADACHE, UNSPECIFIED HEADACHE TYPE: ICD-10-CM

## 2023-09-22 DIAGNOSIS — Z20.822 LAB TEST NEGATIVE FOR COVID-19 VIRUS: ICD-10-CM

## 2023-09-22 LAB
S PYO AG THROAT QL: NEGATIVE
SARS-COV-2 RNA RESP QL NAA+PROBE: NOT DETECTED

## 2023-09-22 RX ORDER — ACETAMINOPHEN 500 MG
1000 TABLET ORAL ONCE
Status: COMPLETED | OUTPATIENT
Start: 2023-09-22 | End: 2023-09-22

## 2023-09-22 NOTE — DISCHARGE INSTRUCTIONS
Take over-the-counter ibuprofen or Tylenol for pain you may try over-the-counter Flonase nasal spray and 24-hour Zyrtec if you develop any runny nose congestion or postnasal drip. Gargle with salt water 2-3 times per day and spit it out. Drink plenty of fluids warm tea with honey. Follow-up with your primary care provider if symptoms persist or worsen.

## 2023-12-15 ENCOUNTER — TELEPHONE (OUTPATIENT)
Dept: FAMILY MEDICINE CLINIC | Facility: CLINIC | Age: 48
End: 2023-12-15

## 2024-01-29 ENCOUNTER — OFFICE VISIT (OUTPATIENT)
Dept: OBGYN CLINIC | Facility: CLINIC | Age: 49
End: 2024-01-29

## 2024-01-29 ENCOUNTER — OFFICE VISIT (OUTPATIENT)
Dept: FAMILY MEDICINE CLINIC | Facility: CLINIC | Age: 49
End: 2024-01-29

## 2024-01-29 VITALS
DIASTOLIC BLOOD PRESSURE: 63 MMHG | WEIGHT: 111 LBS | HEART RATE: 80 BPM | BODY MASS INDEX: 20.69 KG/M2 | SYSTOLIC BLOOD PRESSURE: 99 MMHG | HEIGHT: 61.4 IN

## 2024-01-29 VITALS
WEIGHT: 113 LBS | HEART RATE: 105 BPM | BODY MASS INDEX: 20.28 KG/M2 | DIASTOLIC BLOOD PRESSURE: 72 MMHG | SYSTOLIC BLOOD PRESSURE: 125 MMHG | HEIGHT: 62.5 IN

## 2024-01-29 DIAGNOSIS — Z12.11 COLON CANCER SCREENING: ICD-10-CM

## 2024-01-29 DIAGNOSIS — H53.9 DIFFICULTY READING DUE TO VISUAL PROBLEM: ICD-10-CM

## 2024-01-29 DIAGNOSIS — Z01.419 ENCOUNTER FOR GYNECOLOGICAL EXAMINATION WITHOUT ABNORMAL FINDING: Primary | ICD-10-CM

## 2024-01-29 DIAGNOSIS — Z12.31 VISIT FOR SCREENING MAMMOGRAM: ICD-10-CM

## 2024-01-29 DIAGNOSIS — Z71.85 VACCINE COUNSELING: Primary | ICD-10-CM

## 2024-01-29 DIAGNOSIS — Z00.00 WELL ADULT EXAM: ICD-10-CM

## 2024-01-29 PROCEDURE — 99396 PREV VISIT EST AGE 40-64: CPT | Performed by: OBSTETRICS & GYNECOLOGY

## 2024-01-29 PROCEDURE — 3008F BODY MASS INDEX DOCD: CPT | Performed by: OBSTETRICS & GYNECOLOGY

## 2024-01-29 PROCEDURE — 3074F SYST BP LT 130 MM HG: CPT | Performed by: OBSTETRICS & GYNECOLOGY

## 2024-01-29 PROCEDURE — 3078F DIAST BP <80 MM HG: CPT | Performed by: OBSTETRICS & GYNECOLOGY

## 2024-01-29 NOTE — PROGRESS NOTES
HPI:    Patient ID: Jen Valdes is a 48 year old female.      HPI    Chief Complaint   Patient presents with    Immunization/Injection     Shingles     Referral     For ophthalmology hard to see from close and feels vision foggy        Wt Readings from Last 6 Encounters:   01/29/24 113 lb (51.3 kg)   08/01/23 113 lb (51.3 kg)   01/13/23 112 lb (50.8 kg)   01/11/23 113 lb (51.3 kg)   05/04/22 113 lb (51.3 kg)   09/17/21 108 lb 6.4 oz (49.2 kg)     BP Readings from Last 3 Encounters:   01/29/24 125/72   09/22/23 118/67   08/01/23 124/80     Wanted to get counseling on shingles.    Also feels harder to read last few yrs, using OTC readers but harder.    Would like to see opthalmology    Review of Systems   Eyes:  Positive for visual disturbance.       /72   Pulse 105   Ht 5' 2.5\" (1.588 m)   Wt 113 lb (51.3 kg)   BMI 20.34 kg/m²          Current Outpatient Medications   Medication Sig Dispense Refill    B Complex-C (VITAMIN B + C COMPLEX OR) Take by mouth.      triamcinolone 0.5 % External Cream Use bid as directed 30 g 1    clotrimazole 1 % External Cream Use bid to spot on (face for fungus) if rash returns 30 g 1    SUMAtriptan (IMITREX) 50 MG Oral Tab Take 1 tablet (50 mg total) by mouth every 2 (two) hours as needed for Migraine. Use at onset; repeat once after 2 HRS-ONLY 2 IN 24 HR MAX 9 tablet 0    traMADol HCl 50 MG Oral Tab Take 1 tablet (50 mg total) by mouth every 6 (six) hours as needed for Pain. 30 tablet 0    Montelukast Sodium 10 MG Oral Tab Take 1 tablet (10 mg total) by mouth nightly. 30 tablet 3    loratadine 10 MG Oral Tab Take 1 tablet (10 mg total) by mouth daily. 30 tablet 3    Azelastine HCl 0.1 % Nasal Solution 2 sprays by Nasal route 2 (two) times daily. 1 Bottle 0    ibuprofen 600 MG Oral Tab Take 1 tablet (600 mg total) by mouth every 6 (six) hours as needed for Pain. 30 tablet 0     Allergies:No Known Allergies   PHYSICAL EXAM:     Chief Complaint   Patient presents with     Immunization/Injection     Shingles     Referral     For ophthalmology hard to see from close and feels vision foggy       Physical Exam  Vitals reviewed.   Neurological:      Mental Status: She is alert.                ASSESSMENT/PLAN:     Encounter Diagnoses   Name Primary?    Vaccine counseling Yes    Difficulty reading due to visual problem     Colon cancer screening     Well adult exam        1. Vaccine counseling  Counseled patient on shingles vaccine  Provided indications/ side effects  She will check with her insurance re coverage    2. Difficulty reading due to visual problem    - OPHTHALMOLOGY - INTERNAL    3. Colon cancer screening    - Gastro GI Telephone Colon Screen; Future    4. Well adult exam  Rtc for physical  - CBC With Differential With Platelet; Future  - Comp Metabolic Panel (14); Future  - Lipid Panel; Future  - TSH W Reflex To Free T4; Future      Orders Placed This Encounter   Procedures    CBC With Differential With Platelet    Comp Metabolic Panel (14)    Lipid Panel    TSH W Reflex To Free T4         The above note was creating using Dragon speech recognition technology. Please excuse any typos    Meds This Visit:  Requested Prescriptions      No prescriptions requested or ordered in this encounter       Imaging & Referrals:  OPHTHALMOLOGY - INTERNAL  OP REFERRAL TO [facility] TELEPHONE COLON SCREEN       ID#[id number]

## 2024-01-29 NOTE — PROGRESS NOTES
Jen Valdes is a 48 year old female  No LMP recorded (lmp unknown). Patient is postmenopausal.   Chief Complaint   Patient presents with    Gyn Exam     ANNUAL EXAM --has not done mammogram as ordered.   .tried vaginal estrogen & did not help. Using coconut oil for dryness    OBSTETRICS HISTORY:     OB History    Para Term  AB Living   4 0     4     SAB IAB Ectopic Multiple Live Births   4              # Outcome Date GA Lbr Danny/2nd Weight Sex Delivery Anes PTL Lv   4 SAB      SAB      3 SAB      SAB      2 SAB      SAB      1 SAB      SAB          GYNE HISTORY:     Periods none due to menopause      Postmenopausal    History   Sexual Activity    Sexual activity: Yes        Hx Prior Abnormal Pap: No  Pap Date: 23  Pap Result Notes: NEG PAP / NEG HPV          Latest Ref Rng & Units 2023    10:20 AM 2018     1:12 PM   RECENT PAP RESULTS   Thinprep Pap Negative for intraepithelial lesion or malignancy Negative for intraepithelial lesion or malignancy  Negative for intraepithelial lesion or malignancy    HPV Negative Negative  Negative          MEDICAL HISTORY:     Past Medical History:   Diagnosis Date    Amenorrhea     Amenorrhea due to Ashermann, hysteroscopy - operative 10/2010    Depression     resolved    Fibroid     hysteroscopy - operative     History of pregnancy     SAB x 5, D&C x 4;  molar pregn...chemoRx after    Infertility     laparoscopy-diagnostic     Irregular menstrual cycle     neg endometrial biopsy 2014    Lipid screening 2013    Mole of pregnancy     Chemo Therapy     Premature ovarian failure      Past Surgical History:   Procedure Laterality Date    BIOPSY OF UTERUS LINING      neg endometrial biopsy    D & C      x 4    HYSTEROSCOPY  2004    operative    LAPAROSCOPY,DIAGNOSTIC  , 10/2010    REPAIR OF NASAL SEPTUM  2020    Septoplasty and SMR      OB History    Para Term  AB Living   4 0 0 0 4 0   SAB IAB  Ectopic Multiple Live Births   4 0 0 0 0        SOCIAL HISTORY:     Tobacco Use: Low Risk  (1/29/2024)    Patient History     Smoking Tobacco Use: Never     Smokeless Tobacco Use: Never     Passive Exposure: Not on file       FAMILY HISTORY:     Family History   Problem Relation Age of Onset    Heart Disease Father     High Blood Pressure Father     Stroke Father 62    Prostate Cancer Father     Cancer Father         prostate cancer    Lipids Father     Heart Disorder Father     Hypertension Father     Diabetes Neg     Glaucoma Neg          MEDICATIONS:       Current Outpatient Medications:     B Complex-C (VITAMIN B + C COMPLEX OR), Take by mouth., Disp: , Rfl:     triamcinolone 0.5 % External Cream, Use bid as directed, Disp: 30 g, Rfl: 1    clotrimazole 1 % External Cream, Use bid to spot on (face for fungus) if rash returns, Disp: 30 g, Rfl: 1    SUMAtriptan (IMITREX) 50 MG Oral Tab, Take 1 tablet (50 mg total) by mouth every 2 (two) hours as needed for Migraine. Use at onset; repeat once after 2 HRS-ONLY 2 IN 24 HR MAX, Disp: 9 tablet, Rfl: 0    traMADol HCl 50 MG Oral Tab, Take 1 tablet (50 mg total) by mouth every 6 (six) hours as needed for Pain., Disp: 30 tablet, Rfl: 0    Montelukast Sodium 10 MG Oral Tab, Take 1 tablet (10 mg total) by mouth nightly., Disp: 30 tablet, Rfl: 3    loratadine 10 MG Oral Tab, Take 1 tablet (10 mg total) by mouth daily., Disp: 30 tablet, Rfl: 3    ibuprofen 600 MG Oral Tab, Take 1 tablet (600 mg total) by mouth every 6 (six) hours as needed for Pain., Disp: 30 tablet, Rfl: 0    ALLERGIES:     No Known Allergies      REVIEW OF SYSTEMS:     Constitutional:    denies fever / chills  Eyes:     denies blurred or double vision  Cardiovascular:  denies chest pain or palpitations  Respiratory:    denies shortness of breath  Gastrointestinal:  denies severe abdominal pain, frequent diarrhea or constipation, nausea / vomiting  Genitourinary:    denies dysuria, bothersome  incontinence  Skin/Breast:   denies any breast pain, lumps, or discharge  Neurological:    denies frequent severe headaches  Psychiatric:   denies depression or anxiety, thoughts of harming self or others  Heme/Lymph:    denies easy bruising or bleeding      PHYSICAL EXAM:   Blood pressure 99/63, pulse 80, height 5' 1.4\" (1.56 m), weight 111 lb (50.3 kg), not currently breastfeeding.  Constitutional:  well developed, well nourished  Head/Face:  normocephalic  Neck/Thyroid: thyroid symmetric, no thyromegaly, no nodules, no adenopathy  Lymphatic: no abnormal supraclavicular or axillary adenopathy is noted  Breast:   normal without palpable masses, tenderness, asymmetry, nipple discharge, nipple retraction or skin changes  Abdomen:   soft, nontender, nondistended, no masses  Skin/Hair:  no unusual rashes or bruises  Extremities:  no edema, no cyanosis  Psychiatric:   oriented to time, place, person and situation. Appropriate mood and affect    Pelvic Exam:  External Genitalia:  normal appearance, hair distribution, and no lesions  Urethral Meatus:   normal in size, location, without lesions and prolapse  Bladder:    no fullness, masses or tenderness  Vagina:    normal appearance without lesions, no abnormal discharge  Cervix:    normal without tenderness on motion  Uterus:    normal in size, contour, position, mobility, without tenderness  Adnexa:   normal without masses or tenderness  Perineum:   normal  Anus: no hemorroids         ASSESSMENT & PLAN:     Jen was seen today for gyn exam.    Diagnoses and all orders for this visit:    Encounter for gynecological examination without abnormal finding    Visit for screening mammogram  -     Mammogram Screen 3D Digital Bilateral; Future        SUMMARY:  Pap: Next cotest 1/26-28 per [facility]CCP guidelines.  BCM:  Postmenopausal  STD screening: declines  Mammogram: ordered placed   updated  Depression screen:   Depression Screening (PHQ-2/PHQ-9): Over the LAST 2 WEEKS   Little  interest or pleasure in doing things: Not at all    Feeling down, depressed, or hopeless: Not at all    PHQ-2 SCORE: 0          FOLLOW-UP     Return in about 1 year (around 1/29/2025) for annual gyne exam.    Note to patient and family:  The 21st Century Cures Act makes medical notes available to patients in the interest of transparency.  However, please be advised that this is a medical document.  It is intended as a peer to peer communication.  It is written in medical language and may contain abbreviations or verbiage that are technical and unfamiliar.  It may appear blunt or direct.  Medical documents are intended to carry relevant information, facts as evident, and the clinical opinion of the practitioner.

## 2024-02-01 ENCOUNTER — HOSPITAL ENCOUNTER (OUTPATIENT)
Dept: MAMMOGRAPHY | Facility: HOSPITAL | Age: 49
Discharge: HOME | End: 2024-02-01
Attending: OBSTETRICS & GYNECOLOGY
Payer: COMMERCIAL

## 2024-02-01 DIAGNOSIS — Z12.31 VISIT FOR SCREENING MAMMOGRAM: ICD-10-CM

## 2024-02-01 PROCEDURE — 77063 BREAST TOMOSYNTHESIS BI: CPT | Performed by: OBSTETRICS & GYNECOLOGY

## 2024-02-01 PROCEDURE — 77067 SCR MAMMO BI INCL CAD: CPT | Performed by: OBSTETRICS & GYNECOLOGY

## 2024-02-10 ENCOUNTER — LAB ENCOUNTER (OUTPATIENT)
Dept: LAB | Age: 49
End: 2024-02-10
Attending: FAMILY MEDICINE
Payer: COMMERCIAL

## 2024-02-10 DIAGNOSIS — Z00.00 WELL ADULT EXAM: ICD-10-CM

## 2024-02-10 LAB
ALBUMIN SERPL-MCNC: 4.4 G/DL (ref 3.2–4.8)
ALBUMIN/GLOB SERPL: 1.6 {RATIO} (ref 1–2)
ALP LIVER SERPL-CCNC: 80 U/L
ALT SERPL-CCNC: 16 U/L
ANION GAP SERPL CALC-SCNC: 6 MMOL/L (ref 0–18)
AST SERPL-CCNC: 28 U/L (ref ?–34)
BASOPHILS # BLD AUTO: 0.04 X10(3) UL (ref 0–0.2)
BASOPHILS NFR BLD AUTO: 0.8 %
BILIRUB SERPL-MCNC: 0.5 MG/DL (ref 0.3–1.2)
BUN BLD-MCNC: 16 MG/DL (ref 9–23)
BUN/CREAT SERPL: 19.3 (ref 10–20)
CALCIUM BLD-MCNC: 9.5 MG/DL (ref 8.7–10.4)
CHLORIDE SERPL-SCNC: 109 MMOL/L (ref 98–112)
CHOLEST SERPL-MCNC: 195 MG/DL (ref ?–200)
CO2 SERPL-SCNC: 28 MMOL/L (ref 21–32)
CREAT BLD-MCNC: 0.83 MG/DL
DEPRECATED RDW RBC AUTO: 41.1 FL (ref 35.1–46.3)
EGFRCR SERPLBLD CKD-EPI 2021: 86 ML/MIN/1.73M2 (ref 60–?)
EOSINOPHIL # BLD AUTO: 0.12 X10(3) UL (ref 0–0.7)
EOSINOPHIL NFR BLD AUTO: 2.5 %
ERYTHROCYTE [DISTWIDTH] IN BLOOD BY AUTOMATED COUNT: 12.7 % (ref 11–15)
FASTING PATIENT LIPID ANSWER: YES
FASTING STATUS PATIENT QL REPORTED: YES
GLOBULIN PLAS-MCNC: 2.8 G/DL (ref 2.8–4.4)
GLUCOSE BLD-MCNC: 89 MG/DL (ref 70–99)
HCT VFR BLD AUTO: 42 %
HDLC SERPL-MCNC: 68 MG/DL (ref 40–59)
HGB BLD-MCNC: 14.1 G/DL
IMM GRANULOCYTES # BLD AUTO: 0.01 X10(3) UL (ref 0–1)
IMM GRANULOCYTES NFR BLD: 0.2 %
LDLC SERPL CALC-MCNC: 115 MG/DL (ref ?–100)
LYMPHOCYTES # BLD AUTO: 1.49 X10(3) UL (ref 1–4)
LYMPHOCYTES NFR BLD AUTO: 31.6 %
MCH RBC QN AUTO: 29.6 PG (ref 26–34)
MCHC RBC AUTO-ENTMCNC: 33.6 G/DL (ref 31–37)
MCV RBC AUTO: 88.1 FL
MONOCYTES # BLD AUTO: 0.27 X10(3) UL (ref 0.1–1)
MONOCYTES NFR BLD AUTO: 5.7 %
NEUTROPHILS # BLD AUTO: 2.78 X10 (3) UL (ref 1.5–7.7)
NEUTROPHILS # BLD AUTO: 2.78 X10(3) UL (ref 1.5–7.7)
NEUTROPHILS NFR BLD AUTO: 59.2 %
NONHDLC SERPL-MCNC: 127 MG/DL (ref ?–130)
OSMOLALITY SERPL CALC.SUM OF ELEC: 297 MOSM/KG (ref 275–295)
PLATELET # BLD AUTO: 270 10(3)UL (ref 150–450)
POTASSIUM SERPL-SCNC: 4.3 MMOL/L (ref 3.5–5.1)
PROT SERPL-MCNC: 7.2 G/DL (ref 5.7–8.2)
RBC # BLD AUTO: 4.77 X10(6)UL
SODIUM SERPL-SCNC: 143 MMOL/L (ref 136–145)
TRIGL SERPL-MCNC: 67 MG/DL (ref 30–149)
TSI SER-ACNC: 1.82 MIU/ML (ref 0.55–4.78)
VLDLC SERPL CALC-MCNC: 12 MG/DL (ref 0–30)
WBC # BLD AUTO: 4.7 X10(3) UL (ref 4–11)

## 2024-02-10 PROCEDURE — 80061 LIPID PANEL: CPT

## 2024-02-10 PROCEDURE — 80053 COMPREHEN METABOLIC PANEL: CPT

## 2024-02-10 PROCEDURE — 84443 ASSAY THYROID STIM HORMONE: CPT

## 2024-02-10 PROCEDURE — 85025 COMPLETE CBC W/AUTO DIFF WBC: CPT

## 2024-02-10 PROCEDURE — 36415 COLL VENOUS BLD VENIPUNCTURE: CPT

## 2024-02-13 ENCOUNTER — OFFICE VISIT (OUTPATIENT)
Dept: FAMILY MEDICINE CLINIC | Facility: CLINIC | Age: 49
End: 2024-02-13

## 2024-02-13 VITALS
HEART RATE: 102 BPM | TEMPERATURE: 98 F | BODY MASS INDEX: 21.06 KG/M2 | SYSTOLIC BLOOD PRESSURE: 93 MMHG | HEIGHT: 61.4 IN | WEIGHT: 113 LBS | DIASTOLIC BLOOD PRESSURE: 58 MMHG

## 2024-02-13 DIAGNOSIS — E78.00 HYPERCHOLESTEREMIA: ICD-10-CM

## 2024-02-13 DIAGNOSIS — E28.39 PREMATURE OVARIAN FAILURE: ICD-10-CM

## 2024-02-13 DIAGNOSIS — Z00.00 WELL ADULT EXAM: Primary | ICD-10-CM

## 2024-02-13 DIAGNOSIS — H54.7 VISUAL ACUITY REDUCED: ICD-10-CM

## 2024-02-13 PROCEDURE — 99396 PREV VISIT EST AGE 40-64: CPT | Performed by: FAMILY MEDICINE

## 2024-02-13 PROCEDURE — 3074F SYST BP LT 130 MM HG: CPT | Performed by: FAMILY MEDICINE

## 2024-02-13 PROCEDURE — 3078F DIAST BP <80 MM HG: CPT | Performed by: FAMILY MEDICINE

## 2024-02-13 PROCEDURE — 3008F BODY MASS INDEX DOCD: CPT | Performed by: FAMILY MEDICINE

## 2024-02-13 NOTE — PROGRESS NOTES
HPI:    Patient ID: Jen Valdes is a 48 year old female.    HPI  Chief Complaint   Patient presents with    Routine Physical     Sees gyne          Wt Readings from Last 6 Encounters:   02/13/24 113 lb (51.3 kg)   01/29/24 111 lb (50.3 kg)   01/29/24 113 lb (51.3 kg)   08/01/23 113 lb (51.3 kg)   01/13/23 112 lb (50.8 kg)   01/11/23 113 lb (51.3 kg)     BP Readings from Last 3 Encounters:   02/13/24 93/58   01/29/24 99/63   01/29/24 125/72     Thinks she had tdap about 2017/2018  Lives with parents     Review of Systems   Constitutional:  Negative for activity change, appetite change, chills, fatigue, fever and unexpected weight change.   HENT:  Negative for congestion, dental problem, drooling, ear discharge, ear pain, facial swelling, hearing loss, mouth sores, nosebleeds, postnasal drip, rhinorrhea, sinus pressure, sinus pain, sneezing, sore throat, tinnitus, trouble swallowing and voice change.    Eyes:  Negative for pain, discharge, redness and visual disturbance.   Respiratory:  Negative for cough, shortness of breath and wheezing.    Cardiovascular:  Negative for chest pain, palpitations and leg swelling.   Gastrointestinal:  Negative for abdominal pain, anal bleeding, blood in stool, constipation, diarrhea, nausea, rectal pain and vomiting.   Endocrine: Negative for cold intolerance, heat intolerance, polydipsia, polyphagia and polyuria.   Genitourinary:  Negative for decreased urine volume, difficulty urinating, dysuria, flank pain, frequency, menstrual problem, pelvic pain, urgency, vaginal bleeding, vaginal discharge and vaginal pain.        Is menopausal     Musculoskeletal:  Negative for arthralgias, back pain and myalgias.   Skin:  Negative for rash.   Neurological:  Negative for dizziness, seizures, syncope, weakness, numbness and headaches.   Hematological:  Does not bruise/bleed easily.   Psychiatric/Behavioral:  Negative for behavioral problems, decreased concentration, self-injury, sleep  disturbance and suicidal ideas. The patient is not nervous/anxious.        BP 93/58   Pulse 102   Temp 97.7 °F (36.5 °C) (Temporal)   Ht 5' 1.4\" (1.56 m)   Wt 113 lb (51.3 kg)   LMP  (LMP Unknown)   BMI 21.07 kg/m²     Past Medical History:   Diagnosis Date    Amenorrhea 01/01/2010    Amenorrhea due to Ashermann, hysteroscopy - operative 10/2010    Cancer (HCC)     Depression     resolved    Fibroid     hysteroscopy - operative 2004    History of pregnancy     SAB x 5, D&C x 4;  molar pregn...chemoRx after    Infertility     laparoscopy-diagnostic 2005    Irregular menstrual cycle     neg endometrial biopsy 2014    Lipid screening 04/13/2013    Mole of pregnancy 01/01/2003    Chemo Therapy     Premature ovarian failure 01/01/2013     Past Surgical History:   Procedure Laterality Date    BIOPSY OF UTERUS LINING  2014    neg endometrial biopsy    D & C      x 4    HYSTEROSCOPY  2004    operative    LAPAROSCOPY,DIAGNOSTIC  2005, 10/2010    REPAIR OF NASAL SEPTUM  11/25/2020    Septoplasty and SMR      Social History     Socioeconomic History    Marital status: Single     Spouse name: Not on file    Number of children: Not on file    Years of education: Not on file    Highest education level: Not on file   Occupational History    Not on file   Tobacco Use    Smoking status: Never    Smokeless tobacco: Never   Vaping Use    Vaping Use: Never used   Substance and Sexual Activity    Alcohol use: No     Alcohol/week: 0.0 standard drinks of alcohol    Drug use: No    Sexual activity: Yes   Other Topics Concern     Service Not Asked    Blood Transfusions Not Asked    Caffeine Concern No    Occupational Exposure Not Asked    Hobby Hazards Not Asked    Sleep Concern Not Asked    Stress Concern Not Asked    Weight Concern Not Asked    Special Diet Not Asked    Back Care Not Asked    Exercise Not Asked    Bike Helmet Not Asked    Seat Belt Not Asked    Self-Exams Not Asked    Grew up on a farm No    History of  tanning No    Outdoor occupation No    Breast feeding No    Reaction to local anesthetic No    Pt has a pacemaker No    Pt has a defibrillator No   Social History Narrative    Not on file     Social Determinants of Health     Financial Resource Strain: Not on file   Food Insecurity: Not on file   Transportation Needs: Not on file   Physical Activity: Not on file   Stress: Not on file   Social Connections: Not on file   Housing Stability: Not on file     Family History   Problem Relation Age of Onset    Cancer Self     Heart Disease Father     High Blood Pressure Father     Stroke Father 62    Prostate Cancer Father 75    Cancer Father         prostate cancer    Lipids Father     Heart Disorder Father     Hypertension Father     Diabetes Neg     Glaucoma Neg        Immunization History   Administered Date(s) Administered    Covid-19 Vaccine Pfizer 30 mcg/0.3 ml 04/27/2021, 05/24/2021    Influenza 10/28/2009       Health Maintenance   Topic Date Due    Annual Physical  Never done    Colorectal Cancer Screening  Never done    DTaP,Tdap,and Td Vaccines (1 - Tdap) Never done    Zoster Vaccines (1 of 2) Never done    COVID-19 Vaccine (3 - 2023-24 season) 09/01/2023    Influenza Vaccine (1) 10/01/2023    Gyne Pelvic Exam  01/29/2025    Mammogram  02/01/2025    Pap Smear  01/13/2026    Annual Depression Screening  Completed    Pneumococcal Vaccine: Birth to 64yrs  Aged Out          Current Outpatient Medications   Medication Sig Dispense Refill    B Complex-C (VITAMIN B + C COMPLEX OR) Take by mouth.      triamcinolone 0.5 % External Cream Use bid as directed 30 g 1    clotrimazole 1 % External Cream Use bid to spot on (face for fungus) if rash returns 30 g 1    SUMAtriptan (IMITREX) 50 MG Oral Tab Take 1 tablet (50 mg total) by mouth every 2 (two) hours as needed for Migraine. Use at onset; repeat once after 2 HRS-ONLY 2 IN 24 HR MAX 9 tablet 0    traMADol HCl 50 MG Oral Tab Take 1 tablet (50 mg total) by mouth every 6  (six) hours as needed for Pain. 30 tablet 0    Montelukast Sodium 10 MG Oral Tab Take 1 tablet (10 mg total) by mouth nightly. 30 tablet 3    loratadine 10 MG Oral Tab Take 1 tablet (10 mg total) by mouth daily. 30 tablet 3    ibuprofen 600 MG Oral Tab Take 1 tablet (600 mg total) by mouth every 6 (six) hours as needed for Pain. 30 tablet 0     Allergies:No Known Allergies   PHYSICAL EXAM:     Chief Complaint   Patient presents with    Routine Physical     Sees gyne         Physical Exam  Vitals and nursing note reviewed.   Constitutional:       Appearance: She is well-developed.   HENT:      Head: Normocephalic and atraumatic.      Right Ear: External ear normal.      Left Ear: External ear normal.      Nose: Nose normal.      Mouth/Throat:      Pharynx: No oropharyngeal exudate.   Eyes:      General:         Right eye: No discharge.         Left eye: No discharge.      Conjunctiva/sclera: Conjunctivae normal.      Pupils: Pupils are equal, round, and reactive to light.   Neck:      Thyroid: No thyromegaly.   Cardiovascular:      Rate and Rhythm: Normal rate and regular rhythm.      Heart sounds: Normal heart sounds. No murmur heard.  Pulmonary:      Effort: Pulmonary effort is normal.      Breath sounds: Normal breath sounds. No wheezing.   Abdominal:      General: Bowel sounds are normal.      Palpations: Abdomen is soft. There is no mass.      Tenderness: There is no abdominal tenderness.   Musculoskeletal:         General: No tenderness.      Cervical back: Normal range of motion and neck supple.   Lymphadenopathy:      Cervical: No cervical adenopathy.   Skin:     General: Skin is dry.      Findings: No rash.   Neurological:      Mental Status: She is alert and oriented to person, place, and time.      Cranial Nerves: No cranial nerve deficit.      Motor: No abnormal muscle tone.      Coordination: Coordination normal.      Deep Tendon Reflexes: Reflexes are normal and symmetric. Reflexes normal.    Psychiatric:         Behavior: Behavior normal.         Thought Content: Thought content normal.         Judgment: Judgment normal.                ASSESSMENT/PLAN:     Return yearly for physicals  Follow up with dentist every 6 months  Follow up with eye doctor yearly  Recommend aerobic exercise for at least 30mins 5 days a week  Yearly flu shot  Tetanus booster every 10 years (Tdap/ Td)  Labs ordered/ or reviewed if done prior to appointment     Encounter Diagnoses   Name Primary?    Well adult exam Yes    Premature ovarian failure     Hypercholesteremia     Visual acuity reduced        1. Well adult exam      2. Premature ovarian failure      3. Hypercholesteremia  Improved   Continue healthy diet and exercise    4. Visual acuity reduced    - Optometry Referral - In Network      No orders of the defined types were placed in this encounter.      The above note was creating using Dragon speech recognition technology. Please excuse any typos    Meds This Visit:  Requested Prescriptions      No prescriptions requested or ordered in this encounter       Imaging & Referrals:  OPTOMETRY - INTERNAL       ID#[id number]

## 2024-04-16 NOTE — TELEPHONE ENCOUNTER
Patient is due for physical exam w/PCP. Left message to call back. Please assist in scheduling an appointment.
Never

## 2024-12-19 ENCOUNTER — HOSPITAL ENCOUNTER (OUTPATIENT)
Age: 49
Discharge: HOME | End: 2024-12-19
Payer: COMMERCIAL

## 2024-12-19 VITALS
DIASTOLIC BLOOD PRESSURE: 58 MMHG | TEMPERATURE: 100 F | HEART RATE: 98 BPM | OXYGEN SATURATION: 98 % | RESPIRATION RATE: 20 BRPM | SYSTOLIC BLOOD PRESSURE: 103 MMHG

## 2024-12-19 DIAGNOSIS — Z20.822 COVID-19 RULED OUT BY LABORATORY TESTING: ICD-10-CM

## 2024-12-19 DIAGNOSIS — J10.1 INFLUENZA A: Primary | ICD-10-CM

## 2024-12-19 LAB
POCT INFLUENZA A: POSITIVE
POCT INFLUENZA B: NEGATIVE
S PYO AG THROAT QL: NEGATIVE
SARS-COV-2 RNA RESP QL NAA+PROBE: NOT DETECTED

## 2024-12-19 PROCEDURE — 87081 CULTURE SCREEN ONLY: CPT

## 2024-12-19 RX ORDER — OSELTAMIVIR PHOSPHATE 75 MG/1
75 CAPSULE ORAL 2 TIMES DAILY
Qty: 10 CAPSULE | Refills: 0 | Status: SHIPPED | OUTPATIENT
Start: 2024-12-19 | End: 2024-12-24

## 2024-12-19 RX ORDER — BENZONATATE 200 MG/1
200 CAPSULE ORAL 3 TIMES DAILY PRN
Qty: 30 CAPSULE | Refills: 0 | Status: SHIPPED | OUTPATIENT
Start: 2024-12-19

## 2024-12-19 NOTE — ED PROVIDER NOTES
Chief Complaint   Patient presents with    Cough    Sore Throat    Body ache and/or chills       HPI:     Jen Valdes is a 49 year old female who presents for evaluation and management of a chief complaint of body aches, runny nose, headache, sore throat, cough, fever ongoing since yesterday.  Fever is subjective.  No chest pain or shortness of breath.  No nausea, vomiting, diarrhea, or abdominal pain.    PFSH  PFSH asessment screens reviewed and agree.  Nursing note reviewed and I agree with documentation.    Family History   Problem Relation Age of Onset    Cancer Self     Heart Disease Father     High Blood Pressure Father     Stroke Father 62    Prostate Cancer Father 75    Cancer Father         prostate cancer    Lipids Father     Heart Disorder Father     Hypertension Father     Diabetes Neg     Glaucoma Neg      Family history reviewed with patient/caregiver and is not pertinent to presenting problem.  Social History     Socioeconomic History    Marital status: Single     Spouse name: Not on file    Number of children: Not on file    Years of education: Not on file    Highest education level: Not on file   Occupational History    Not on file   Tobacco Use    Smoking status: Never    Smokeless tobacco: Never   Vaping Use    Vaping status: Never Used   Substance and Sexual Activity    Alcohol use: No     Alcohol/week: 0.0 standard drinks of alcohol    Drug use: No    Sexual activity: Yes   Other Topics Concern     Service Not Asked    Blood Transfusions Not Asked    Caffeine Concern No    Occupational Exposure Not Asked    Hobby Hazards Not Asked    Sleep Concern Not Asked    Stress Concern Not Asked    Weight Concern Not Asked    Special Diet Not Asked    Back Care Not Asked    Exercise Not Asked    Bike Helmet Not Asked    Seat Belt Not Asked    Self-Exams Not Asked    Grew up on a farm No    History of tanning No    Outdoor occupation No    Breast feeding No    Reaction to local anesthetic No     Pt has a pacemaker No    Pt has a defibrillator No   Social History Narrative    Not on file     Social Drivers of Health     Financial Resource Strain: Not on file   Food Insecurity: Not on file   Transportation Needs: Not on file   Physical Activity: Not on file   Stress: Not on file   Social Connections: Not on file   Housing Stability: Not on file        Findings:    /58   Pulse 98   Temp 99.7 °F (37.6 °C) (Oral)   Resp 20   LMP  (LMP Unknown)   SpO2 98%   GENERAL: well developed, well nourished, well hydrated, no distress  HEAD: normocephalic  NECK: supple, no adenopathy  EYES: sclera non icteric bilateral, conjunctiva clear  EARS: TM  bilateral: normal  NOSE: nasal turbinates: swollen, red, and clear drainage  THROAT: clear, without exudates  CARDIO: RRR without murmur  LUNGS: clear to auscultation bilaterally; no rales, rhonchi, or wheezes  GI: soft, non-tender, normal bowel sounds  SKIN: good skin turgor, no obvious rashes    MDM/Assessment/Plan:   Orders for this encounter:  Orders Placed This Encounter    POCT Rapid Strep    POCT Flu Test     Order Specific Question:   Release to patient     Answer:   Immediate    Rapid SARS-CoV-2 by PCR     Order Specific Question:   Release to patient     Answer:   Immediate    Grp A Strep Cult, Throat    POCT Rapid Strep    benzonatate 200 MG Oral Cap     Sig: Take 1 capsule (200 mg total) by mouth 3 (three) times daily as needed.     Dispense:  30 capsule     Refill:  0    oseltamivir (TAMIFLU) 75 MG Oral Cap     Sig: Take 1 capsule (75 mg total) by mouth 2 (two) times daily for 5 days.     Dispense:  10 capsule     Refill:  0       Labs performed this visit:  Recent Results (from the past 10 hours)   POCT Flu Test    Collection Time: 12/19/24  1:05 PM    Specimen: Nares; Other   Result Value Ref Range    POCT INFLUENZA A Positive (A) Negative    POCT INFLUENZA B Negative Negative   Rapid SARS-CoV-2 by PCR    Collection Time: 12/19/24  1:05 PM    Specimen:  Nares; Other   Result Value Ref Range    Rapid SARS-CoV-2 by PCR Not Detected Not Detected   POCT Rapid Strep    Collection Time: 12/19/24  1:17 PM   Result Value Ref Range    POCT Rapid Strep Negative Negative       MDM:   Medical Decision Making  Differentials considered: COVID, flu, strep, viral URI, bronchitis, pneumonia vs other    HPI and exam consistent influenza A.  COVID and strep are negative.  Patient is healthy appearing, normal vitals, lungs clear, low suspicion for brochitis or pneumonia.  Will start Tamiflu and Tessalon Perles.  Supportive care discussed.  She was advised to follow-up with primary care provider in 1 week if no improvement.  Patient verbalized understanding and agreeable to plan of care.           Diagnosis:    ICD-10-CM    1. Influenza A  J10.1       2. COVID-19 ruled out by laboratory testing  Z20.822 POCT Flu Test     Rapid SARS-CoV-2 by PCR     POCT Flu Test     Rapid SARS-CoV-2 by PCR          All results reviewed and discussed with patient.  See AVS for detailed discharge instructions for your condition today.    Follow Up with:  No follow-up provider specified.

## 2024-12-19 NOTE — DISCHARGE INSTRUCTIONS
Tessalon Perles 1 tablet every 8 hours as needed for cough  Tamiflu 1 tablet twice per day for 5 days  Please drink plenty of fluids  Steam showers  Take ibuprofen (Motrin, Advil) 600 mg every 6 hours for fever/aches, take with food  OR  Acetaminophen (Tylenol) 650-1000 mg every 6 hours for fever/aches  Rest!  Mucinex DM twice per day for 7 days, with plenty of fluids  For chest pain, shortness of breath or worsening symptoms, please go to ER  Please see your primary care provider if no improvement in 5-7 days

## 2025-03-20 ENCOUNTER — OFFICE VISIT (OUTPATIENT)
Dept: OBGYN CLINIC | Facility: CLINIC | Age: 50
End: 2025-03-20
Payer: COMMERCIAL

## 2025-03-20 ENCOUNTER — LAB ENCOUNTER (OUTPATIENT)
Dept: LAB | Facility: HOSPITAL | Age: 50
End: 2025-03-20
Attending: OBSTETRICS & GYNECOLOGY
Payer: COMMERCIAL

## 2025-03-20 VITALS
BODY MASS INDEX: 21 KG/M2 | DIASTOLIC BLOOD PRESSURE: 58 MMHG | HEART RATE: 68 BPM | WEIGHT: 114.38 LBS | SYSTOLIC BLOOD PRESSURE: 95 MMHG

## 2025-03-20 DIAGNOSIS — Z01.411 ENCOUNTER FOR GYNECOLOGICAL EXAMINATION WITH ABNORMAL FINDING: Primary | ICD-10-CM

## 2025-03-20 DIAGNOSIS — Z11.3 SCREEN FOR STD (SEXUALLY TRANSMITTED DISEASE): ICD-10-CM

## 2025-03-20 DIAGNOSIS — Z12.31 VISIT FOR SCREENING MAMMOGRAM: ICD-10-CM

## 2025-03-20 DIAGNOSIS — N89.8 VAGINAL DRYNESS: ICD-10-CM

## 2025-03-20 LAB
HBV SURFACE AG SER-ACNC: <0.1 [IU]/L
HBV SURFACE AG SERPL QL IA: NONREACTIVE
HCV AB SERPL QL IA: NONREACTIVE
T PALLIDUM AB SER QL IA: NONREACTIVE

## 2025-03-20 PROCEDURE — 86780 TREPONEMA PALLIDUM: CPT

## 2025-03-20 PROCEDURE — 99396 PREV VISIT EST AGE 40-64: CPT | Performed by: OBSTETRICS & GYNECOLOGY

## 2025-03-20 PROCEDURE — 3074F SYST BP LT 130 MM HG: CPT | Performed by: OBSTETRICS & GYNECOLOGY

## 2025-03-20 PROCEDURE — 87340 HEPATITIS B SURFACE AG IA: CPT

## 2025-03-20 PROCEDURE — 87389 HIV-1 AG W/HIV-1&-2 AB AG IA: CPT

## 2025-03-20 PROCEDURE — 86803 HEPATITIS C AB TEST: CPT

## 2025-03-20 PROCEDURE — 3078F DIAST BP <80 MM HG: CPT | Performed by: OBSTETRICS & GYNECOLOGY

## 2025-03-20 PROCEDURE — 36415 COLL VENOUS BLD VENIPUNCTURE: CPT

## 2025-03-20 RX ORDER — ESTRADIOL 0.1 MG/G
0.5 CREAM VAGINAL DAILY
Qty: 42.5 G | Refills: 1 | Status: SHIPPED | OUTPATIENT
Start: 2025-03-20

## 2025-03-20 NOTE — PROGRESS NOTES
Jen Valdes is a 49 year old female  No LMP recorded (lmp unknown). Patient is postmenopausal.   Chief Complaint   Patient presents with    Gyn Exam     ANNUAL EXAM & VAGINAL DRINESS  -Reviewed Preventative/Wellness form with patient.      Sexually Transmitted Infection Screen     Wishes for full screen    Gyn Problem     Vaginal dryness   .  History of Present Illness  Ms. Jen Valdes is a 49 year old female who presents for her annual gynecologic exam.    She has not had any changes in her medical history or surgeries in the past year and is not currently taking any medications.    She experiences persistent vaginal dryness, which occurs consistently and not just during intimacy. 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 No hot flashes or night sweats.    She is interested in STD testing, including cultures for gonorrhea, chlamydia, and trichomonas, as well as blood tests for HIV, syphilis, hepatitis B, and C.    Her last Pap smear was performed in 2023 and was normal with negative HPV. She is due for her next Pap smear next year. She had a mammogram a year ago and requires an order to schedule her next one.    She has not yet undergone a screening colonoscopy, which is now recommended starting at age 45.    OBSTETRICS HISTORY:     OB History    Para Term  AB Living   4 0 0 0 4 0   SAB IAB Ectopic Multiple Live Births   4 0 0 0 0      # Outcome Date GA Lbr Danny/2nd Weight Sex Type Anes PTL Lv   4 SAB      SAB      3 SAB      SAB      2 SAB      SAB      1 SAB      SAB          GYNE HISTORY:     Periods absent      BCM:  Postmenopausal    History   Sexual Activity    Sexual activity: Yes        Hx Prior Abnormal Pap: No  Pap Date: 23  Pap Result Notes: NEG PAP / NEG HPV  Follow Up Recommendation: MAMMO 24 BRONSON BENIGN          Latest Ref Rng & Units 2023    10:20 AM 2018     1:12 PM   RECENT PAP RESULTS   INTERPRETATION/RESULT: Negative  for intraepithelial lesion or malignancy Negative for intraepithelial lesion or malignancy  Negative for intraepithelial lesion or malignancy    HPV Negative Negative  Negative          MEDICAL HISTORY:     Past Medical History:    Amenorrhea    Amenorrhea due to Ashermann, hysteroscopy - operative 10/2010    Cancer (HCC)    Depression    resolved    Fibroid    hysteroscopy - operative     History of pregnancy    SAB x 5, D&C x 4;  molar pregn...chemoRx after    Infertility    laparoscopy-diagnostic     Irregular menstrual cycle    neg endometrial biopsy     Lipid screening    Mole of pregnancy (HCC)    Chemo Therapy     Premature ovarian failure     Past Surgical History:   Procedure Laterality Date    Biopsy of uterus lining      neg endometrial biopsy    D & c      x 4    Hysteroscopy  2004    operative    Laparoscopy,diagnostic  , 10/2010    Repair of nasal septum  2020    Septoplasty and SMR      OB History    Para Term  AB Living   4 0 0 0 4 0   SAB IAB Ectopic Multiple Live Births   4 0 0 0 0        SOCIAL HISTORY:     Tobacco Use: Low Risk  (3/20/2025)    Patient History     Smoking Tobacco Use: Never     Smokeless Tobacco Use: Never     Passive Exposure: Not on file       FAMILY HISTORY:     Family History   Problem Relation Age of Onset    Cancer Self     Heart Disease Father     High Blood Pressure Father     Stroke Father 62    Prostate Cancer Father 75    Cancer Father         prostate cancer    Lipids Father     Heart Disorder Father     Hypertension Father     Diabetes Neg     Glaucoma Neg          MEDICATIONS:     Current Outpatient Medications:   estradiol (ESTRACE) 0.1 MG/GM Vaginal Cream, Place 0.5 g vaginally daily. Use nightly for 2 weeks then every Monday / Thursday night, Disp: 42.5 g, Rfl: 1    ALLERGIES:     Allergies[1]      REVIEW OF SYSTEMS:     Constitutional:    denies fever / chills  Eyes:     denies blurred or double vision  Cardiovascular:   denies chest pain or palpitations  Respiratory:    denies shortness of breath  Gastrointestinal:  denies severe abdominal pain, frequent diarrhea or constipation, nausea / vomiting  Genitourinary:    denies dysuria, bothersome incontinence  Skin/Breast:   denies any breast pain, lumps, or discharge  Neurological:    denies frequent severe headaches  Psychiatric:   denies depression or anxiety, thoughts of harming self or others  Heme/Lymph:    denies easy bruising or bleeding      PHYSICAL EXAM:   Blood pressure 95/58, pulse 68, weight 114 lb 6.4 oz (51.9 kg), not currently breastfeeding.  Constitutional:  well developed, well nourished  Head/Face:  normocephalic  Neck/Thyroid: thyroid symmetric, no thyromegaly, no nodules, no adenopathy  Lymphatic: no abnormal supraclavicular or axillary adenopathy is noted  Breast:   normal without palpable masses, tenderness, asymmetry, nipple discharge, nipple retraction or skin changes  Abdomen:   soft, nontender, nondistended, no masses  Skin/Hair:  no unusual rashes or bruises  Extremities:  no edema, no cyanosis  Psychiatric:   oriented to time, place, person and situation. Appropriate mood and affect    Pelvic Exam:  External Genitalia:  normal appearance, hair distribution, and no lesions  Urethral Meatus:   normal in size, location, without lesions and prolapse  Bladder:    no fullness, masses or tenderness  Vagina:    normal appearance without lesions, no abnormal discharge (+) atrophic  Cervix:    normal without tenderness on motion  Uterus:    normal in size, contour, position, mobility, without tenderness  Adnexa:   normal without masses or tenderness  Perineum:   normal  Anus: no hemorroids     Results  Procedure: Cervical Culture  Description: The smallest speculum was inserted into the vagina. The cervix was visualized. A swab was inserted into the cervix to obtain a sample.    PATHOLOGY  Pap smear: Negative, normal with negative HPV (01/2023)    ASSESSMENT &  PLAN:     Jen was seen today for gyn exam, sexually transmitted infection screen and gyn problem.    Diagnoses and all orders for this visit:    Encounter for gynecological examination with abnormal finding  -     Chlamydia/Gc Amplification; Future  -     Trichomonas vaginalis, ZARA (Vaginal/Cervical); Future  -     Trichomonas vaginalis, ZARA (Vaginal/Cervical)  -     Chlamydia/Gc Amplification    Screen for STD (sexually transmitted disease)  -     HCV Antibody; Future  -     Hepatitis B Surface Antigen; Future  -     HIV Ag/Ab Combo; Future  -     T Pallidum Screening Cascade; Future  -     Chlamydia/Gc Amplification; Future  -     Trichomonas vaginalis, ZARA (Vaginal/Cervical); Future  -     Trichomonas vaginalis, ZARA (Vaginal/Cervical)  -     Chlamydia/Gc Amplification    Vaginal dryness  -     Chlamydia/Gc Amplification; Future  -     Trichomonas vaginalis, ZARA (Vaginal/Cervical); Future  -     Trichomonas vaginalis, ZARA (Vaginal/Cervical)  -     Chlamydia/Gc Amplification    Visit for screening mammogram  -     Mammogram Screen 3D Digital Bilateral; Future    Other orders  -     estradiol (ESTRACE) 0.1 MG/GM Vaginal Cream; Place 0.5 g vaginally daily. Use nightly for 2 weeks then every Monday / Thursday night      Assessment & Plan  Vaginal dryness  Persistent vaginal dryness without other menopausal symptoms such as hot flashes or night sweats. Discussed vaginal estrogen therapy to improve symptoms. Initial treatment involves nightly application for two weeks to build up the vaginal lining, followed by maintenance dosing twice a week. One tube of estrogen is expected to last almost a year with maintenance dosing.  - Prescribe vaginal estrogen cream for nightly use for two weeks, then twice weekly (Monday and Thursday) for maintenance.    Annual gynecologic exam  Routine annual gynecologic examination. Last Pap smear in January 2023 was normal with negative HPV. Mammogram was done a year ago, and an order is  needed to schedule the next one.  - Order mammogram.  - Schedule Pap smear for next year.    STD screening  Requested comprehensive STD screening. Discussed the importance of screening as many STDs are asymptomatic until significant organ damage occurs.  - Perform cultures for gonorrhea, chlamydia, and trichomonas.  - Order blood tests for HIV, syphilis, hepatitis B, and C.    Colorectal cancer screening  Overdue for a screening colonoscopy. Discussed the importance of screening due to increased incidence of colon cancer in younger adults. Explained that if the test is negative, it does not need to be repeated frequently, but if polyps are found, more frequent screening will be necessary. GI department is conveniently located next door for scheduling.  - Refer to GI department for colonoscopy scheduling.    SUMMARY:  Pap: Next cotest 1/26-28 per ASCCP guidelines.  BCM:  Postmenopausal  STD screening: GC/Chl/Trich/HepB/HepC/HIV/RPR, condoms encouraged  Mammogram: ordered placed   updated  Depression screen:   Depression Screening (PHQ-2/PHQ-9): Over the LAST 2 WEEKS   Little interest or pleasure in doing things (over the last two weeks)?: Not at all    Feeling down, depressed, or hopeless (over the last two weeks)?: Not at all    PHQ-2 SCORE: 0          FOLLOW-UP     Return in about 1 year (around 3/20/2026) for annual gyne exam, cotest, medication refill.    Note to patient and family:  The 21st Century Cures Act makes medical notes available to patients in the interest of transparency.  However, please be advised that this is a medical document.  It is intended as a peer to peer communication.  It is written in medical language and may contain abbreviations or verbiage that are technical and unfamiliar.  It may appear blunt or direct.  Medical documents are intended to carry relevant information, facts as evident, and the clinical opinion of the practitioner.         [1] No Known Allergies

## 2025-03-20 NOTE — PROGRESS NOTES
The following individual(s) verbally consented to be recorded using ambient AI listening technology and understand that they can each withdraw their consent to this listening technology at any point by asking the clinician to turn off or pause the recording:    Patient name: Jen Valdes  Additional names:

## 2025-03-21 LAB
C TRACH DNA SPEC QL NAA+PROBE: NEGATIVE
N GONORRHOEA DNA SPEC QL NAA+PROBE: NEGATIVE
T VAGINALIS RRNA SPEC QL NAA+PROBE: NEGATIVE

## 2025-03-27 ENCOUNTER — HOSPITAL ENCOUNTER (OUTPATIENT)
Dept: MAMMOGRAPHY | Facility: HOSPITAL | Age: 50
Discharge: HOME | End: 2025-03-27
Attending: OBSTETRICS & GYNECOLOGY
Payer: COMMERCIAL

## 2025-03-27 DIAGNOSIS — Z12.31 VISIT FOR SCREENING MAMMOGRAM: ICD-10-CM

## 2025-03-27 PROCEDURE — 77067 SCR MAMMO BI INCL CAD: CPT | Performed by: OBSTETRICS & GYNECOLOGY

## 2025-03-27 PROCEDURE — 77063 BREAST TOMOSYNTHESIS BI: CPT | Performed by: OBSTETRICS & GYNECOLOGY

## 2025-04-08 RX ORDER — ESTRADIOL 0.1 MG/G
CREAM VAGINAL
Qty: 42.5 G | Refills: 1 | OUTPATIENT
Start: 2025-04-08

## 2025-04-08 NOTE — TELEPHONE ENCOUNTER
Disp Refills Start End    estradiol (ESTRACE) 0.1 MG/GM Vaginal Cream 42.5 g 1 3/20/2025 --    Sig - Route: Place 0.5 g vaginally daily. Use nightly for 2 weeks then every Monday / Thursday night - Vaginal    Sent to pharmacy as: Estradiol 0.1 MG/GM Vaginal Cream (Estrace)    Notes to Pharmacy: Emphasize only 0.5 gram dosing    E-Prescribing Status: Receipt confirmed by pharmacy (3/20/2025  3:53 PM CDT)      Pharmacy    [id number] - [place], [place] - [address] AT [facility] [address], [phone], [phone]     Prescription denied.

## 2025-04-15 ENCOUNTER — TELEPHONE (OUTPATIENT)
Dept: OBGYN CLINIC | Facility: CLINIC | Age: 50
End: 2025-04-15

## 2025-04-15 NOTE — TELEPHONE ENCOUNTER
Notified patient of Dr. Andino message below. Patient accepts that it was also her error. Patient will wait for next refill covered.

## 2025-04-15 NOTE — TELEPHONE ENCOUNTER
Only to have used 0.5 mg NOT 5 mg. Will now need to wait until pharm willing to refill or she can try to see if good rx will allow her to self pay. Pharmacy should have shown her how much dosing to use. If does not want cream, could consider instead vagifem which is a tablet that gets inserted.

## 2025-04-15 NOTE — TELEPHONE ENCOUNTER
Patient called to discuss test results and mammogram. Also wanted refill of Estrodial cream to [facility] in [place]. Looks like there is a refill but [facility] advised patient  to call office. Please call after 3:30

## 2025-04-15 NOTE — TELEPHONE ENCOUNTER
Alfonso Li.  Your mammogram was negative / normal. The next one is due in one year.  I encourage you to still do monthly self breast exam (best time if you get periods is week immediately following your period if you get periods). Have a good day.   Written by Sylvie Andino MD on 4/7/2025 10:22 PM CDT    Lmtcb

## 2025-04-15 NOTE — TELEPHONE ENCOUNTER
Called Pharmacy, states insurance is not approving a refill since prescription was sent as ESTRACE 0.5 mg. Recommendations: nightly for 2 weeks first, then went to Monday/Thursday. Pharmacy states it is a 90 day supply, so patient is not due for refill until 5/27.     Informed of negative Mammogram and labs, she states understanding. We talked about medication above. Patient states she has no cream left. States she did not have enough for the 2 weeks. We addressed how much she was using, she states she was using 5 gms nightly, no 0.5 mg for the 2 weeks. We discussed that usage was to much. She states she was confused by the syringes that came with the tube. She indicated she filled it up to 5. Informed will route to Dr. Andino for review and recommendations.     To Dr. Andino, please review and advise. Thank you.

## 2025-07-02 ENCOUNTER — HOSPITAL ENCOUNTER (OUTPATIENT)
Age: 50
Discharge: HOME | End: 2025-07-02
Payer: COMMERCIAL

## 2025-07-02 VITALS
HEART RATE: 74 BPM | DIASTOLIC BLOOD PRESSURE: 40 MMHG | SYSTOLIC BLOOD PRESSURE: 100 MMHG | RESPIRATION RATE: 18 BRPM | TEMPERATURE: 98 F | OXYGEN SATURATION: 100 %

## 2025-07-02 DIAGNOSIS — B96.89 BACTERIAL SINUSITIS: Primary | ICD-10-CM

## 2025-07-02 DIAGNOSIS — J32.9 BACTERIAL SINUSITIS: Primary | ICD-10-CM

## 2025-07-02 DIAGNOSIS — B34.9 VIRAL ILLNESS: ICD-10-CM

## 2025-07-02 LAB — SARS-COV-2 RNA RESP QL NAA+PROBE: NOT DETECTED

## 2025-07-02 PROCEDURE — U0002 COVID-19 LAB TEST NON-CDC: HCPCS | Performed by: PHYSICIAN ASSISTANT

## 2025-07-02 PROCEDURE — 99213 OFFICE O/P EST LOW 20 MIN: CPT | Performed by: PHYSICIAN ASSISTANT

## 2025-07-02 RX ORDER — LORATADINE 10 MG/1
10 TABLET ORAL DAILY
Qty: 30 TABLET | Refills: 0 | Status: SHIPPED | OUTPATIENT
Start: 2025-07-02 | End: 2025-08-01

## 2025-07-02 NOTE — ED PROVIDER NOTES
Patient Seen in: [facility]        History  Chief Complaint   Patient presents with    Nasal Congestion    Sinus Problem    Headache     Stated Complaint: Headache; Sinus pressure ; Neck pain; Fever    Subjective:   HPI            Patient is a 49-year-old female who presents to immediate care due to sinus pain x 3 days.  Patient states that she has had sinus congestion over the past 2 weeks.  Has been using over-the-counter sinus medication with mild relief.  Now notes neck pain and headache.  Denies fever, sore throat ear pain.  States symptoms feel similar to previous sinusitis infections.      Objective:     Past Medical History:    Amenorrhea    Amenorrhea due to Ashermann, hysteroscopy - operative 10/2010    Cancer (HCC)    Depression    resolved    Fibroid    hysteroscopy - operative 2004    History of pregnancy    SAB x 5, D&C x 4;  molar pregn...chemoRx after    Infertility    laparoscopy-diagnostic 2005    Irregular menstrual cycle    neg endometrial biopsy 2014    Lipid screening    Mole of pregnancy (HCC)    Chemo Therapy     Premature ovarian failure              Past Surgical History:   Procedure Laterality Date    Biopsy of uterus lining  2014    neg endometrial biopsy    D & c      x 4    Hysteroscopy  2004    operative    Laparoscopy,diagnostic  2005, 10/2010    Repair of nasal septum  11/25/2020    Septoplasty and SMR                 Social History     Socioeconomic History    Marital status: Single   Tobacco Use    Smoking status: Never    Smokeless tobacco: Never   Vaping Use    Vaping status: Never Used   Substance and Sexual Activity    Alcohol use: No     Alcohol/week: 0.0 standard drinks of alcohol    Drug use: No    Sexual activity: Yes   Other Topics Concern    Caffeine Concern No    Grew up on a farm No    History of tanning No    Outdoor occupation No    Breast feeding No    Reaction to local anesthetic No    Pt has a pacemaker No    Pt has a defibrillator No     Social  Drivers of Health     Food Insecurity: No Food Insecurity (3/20/2025)    NCSS - Food Insecurity     Worried About Running Out of Food in the Last Year: No     Ran Out of Food in the Last Year: No   Transportation Needs: No Transportation Needs (3/20/2025)    NCSS - Transportation     Lack of Transportation: No   Housing Stability: Not At Risk (3/20/2025)    NCSS - Housing/Utilities     Has Housing: Yes     Worried About Losing Housing: No     Unable to Get Utilities: No              Review of Systems    Positive for stated complaint: Headache; Sinus pressure ; Neck pain; Fever  Other systems are as noted in HPI.  Constitutional and vital signs reviewed.      All other systems reviewed and negative except as noted above.                  Physical Exam    ED Triage Vitals [07/02/25 0825]   /40   Pulse 74   Resp 18   Temp 98.2 °F (36.8 °C)   Temp src Oral   SpO2 100 %   O2 Device None (Room air)       Current Vitals:   Vital Signs  BP: 100/40  Pulse: 74  Resp: 18  Temp: 98.2 °F (36.8 °C)  Temp src: Oral    Oxygen Therapy  SpO2: 100 %  O2 Device: None (Room air)            Physical Exam  Vital signs reviewed. Nursing note reviewed.  Constitutional: Well-developed. Well-nourished. In no acute distress  HENT: Mucous membranes moist. TMs intact bilaterally. No trismus. Uvula midline. Mild posterior pharynx erythema.  No petechiae, exudates, or posterior pharynx edema. +sinus ttp  EYES: No scleral icterus or conjunctival injection.  NECK: Full ROM. Supple.   CARDIAC: Normal rate. Normal S1/ S2. 2+ distal pulses. No edema  PULM/CHEST: Clear to auscultation bilaterally. No wheezes  Extremities: Full ROM  NEURO: Awake, alert, following commands, moving extremities, answering questions.   SKIN: Warm and dry. No rash or lesions.  PSYCH: Normal judgment. Normal affect.            ED Course  Labs Reviewed   RAPID SARS-COV-2 BY PCR - Normal                            MDM     Patient is a 49-year-old female that presents to  immediate care due to sinus pain and pressure x 3 days after sinus congestion x 2 weeks.  Patient arrives with stable vitals, sitting comfortably.  Physical exam showing point tenderness palpation of b/l maxillary sinus.  Most likely bacterial sinusitis.  Less likely dental infection, otitis media, or trigeminal neuralgia.  Less likely covid as patient had rapid negative test today in immediate care.  Will treat with Augmentin for 1 week.  Encouraged use of antihistamines such as Claritin or Zyrtec along with Benadryl at nighttime as needed.  Additionally encourage nasal sprays such as Flonase.  Return protocols including worsening pain, fever.  Patient agreeable to plan and all questions answered.  History given by patient.          Medical Decision Making      Disposition and Plan     Clinical Impression:  1. Bacterial sinusitis    2. Viral illness         Disposition:  Discharge  7/2/2025  9:21 am    Follow-up:  Jazmin Pabon MD  [address] [place] [id number]  [phone]    Call             Medications Prescribed:  Current Discharge Medication List        START taking these medications    Details   loratadine 10 MG Oral Tab Take 1 tablet (10 mg total) by mouth daily.  Qty: 30 tablet, Refills: 0      amoxicillin clavulanate 875-125 MG Oral Tab Take 1 tablet by mouth 2 (two) times daily for 7 days.  Qty: 14 tablet, Refills: 0                   Supplementary Documentation:

## (undated) NOTE — LETTER
Date & Time: 4/3/2018, 10:51 AM  Patient: Britany Weaver  Attending Provider:    Sincerely,    Prasanna Swanson MD         To Whom It May Concern:    Alysia Zeng was seen and treated in our department on 4/3/2018.  She should not return to work until

## (undated) NOTE — LETTER
9/13/2019              [address] [place] [id number]         Dear [facility],    It was a pleasure to see you. Your mammogram was negative / normal. The next one is due in one year.  I encourage you to still do monthly se

## (undated) NOTE — LETTER
Juan [facility]raft, [address]  [place], [place]       09/17/20        Patient: Chava Wren   YOB: 1975   Date of Visit: 9/17/2020       Dear  Dr. Remonia Kehr, MD,      Thank you for referring Chava Wren to my practice.   Bethanie

## (undated) NOTE — LETTER
3/9/2020              [address]         Dear Rylee Kaba,    This letter is to inform you that our office has made several attempts to reach you by phone without success.   We were attempting to contact

## (undated) NOTE — Clinical Note
6/22/2017              [facility]         Dear Yanelis Reyna,    This letter is to inform you that our office has made several attempts to reach you by phone without success.   We were attempting to contact you

## (undated) NOTE — LETTER
Date & Time: 9/22/2023, 11:00 AM  Patient: Arelis Engel  Encounter Provider(s):    RYLIE Velez       To Whom It May Concern:    Brinda Joseph was seen and treated in our department on 9/22/2023. She should not return to work until 09/25/2023 . If you have any questions or concerns, please do not hesitate to call.     PREET Melo    _____________________________  [id number]/[id number] Signature

## (undated) NOTE — MR AVS SNAPSHOT
[address], [address]  [address] [facility]  [phone]               Thank you for choosing us for your health care visit with Dre Braswell.  MD Cm.  We are glad to serve you and happy to provide you with this summary Juan Bachelor INTERNAL [[id number] CPT(R)]  Order #:  [id number]         **REFERRAL REQUEST**    Your physician has referred you to a specialist.  Your physician or the clinic staff will provide you with the phone number you should call to schedule your appoin information, go to [email or web address] and click on the Sign Up Now link in the [employer] box. Enter your [employer] Activation Code exactly as it appears below along with your Zip Code and Date of Birth to complete the sign-up process.  If you do

## (undated) NOTE — LETTER
8/15/2018              [facility]         Dear Delmi Fournier,      It was a pleasure to see you at our [address] [facility] office. Normal Pap and high risk HPV negative.

## (undated) NOTE — LETTER
8/15/2018              [facility]         Dear Feli Fears,      It was a pleasure to see you at our [address] [facility] office.   Normal mammogram.  Next in one year.  E

## (undated) NOTE — LETTER
Date & Time: 7/2/2025, 9:18 AM  Patient: Jen Valdes  Encounter Provider(s):    Bertha Nathan PA-C       To Whom It May Concern:    Jen Valdes was seen and treated in our department on 7/2/2025. She should not return to work until 7/7/2025.    If you have any questions or concerns, please do not hesitate to call.        _____________________________  Physician/APC Signature

## (undated) NOTE — LETTER
2/27/2020          To Whom It May Concern:    Jb Bryant is currently under my medical care and may return to regular  work  Tomorrow 2/28/2020 But no overtime for 2 weeks. Activity is restricted as follows: none.     If you require additional infor